# Patient Record
Sex: MALE | Race: WHITE | NOT HISPANIC OR LATINO | URBAN - METROPOLITAN AREA
[De-identification: names, ages, dates, MRNs, and addresses within clinical notes are randomized per-mention and may not be internally consistent; named-entity substitution may affect disease eponyms.]

---

## 2024-01-04 ENCOUNTER — EMERGENCY (EMERGENCY)
Facility: HOSPITAL | Age: 64
LOS: 1 days | Discharge: HOME | End: 2024-01-04
Attending: EMERGENCY MEDICINE | Admitting: EMERGENCY MEDICINE
Payer: COMMERCIAL

## 2024-01-04 VITALS
DIASTOLIC BLOOD PRESSURE: 71 MMHG | RESPIRATION RATE: 16 BRPM | HEART RATE: 81 BPM | OXYGEN SATURATION: 100 % | TEMPERATURE: 98 F | SYSTOLIC BLOOD PRESSURE: 129 MMHG

## 2024-01-04 VITALS
TEMPERATURE: 98 F | OXYGEN SATURATION: 97 % | RESPIRATION RATE: 16 BRPM | HEART RATE: 85 BPM | SYSTOLIC BLOOD PRESSURE: 146 MMHG | DIASTOLIC BLOOD PRESSURE: 76 MMHG

## 2024-01-04 LAB
ALBUMIN SERPL ELPH-MCNC: 4.3 G/DL — SIGNIFICANT CHANGE UP (ref 3.3–5)
ALBUMIN SERPL ELPH-MCNC: 4.3 G/DL — SIGNIFICANT CHANGE UP (ref 3.3–5)
ALP SERPL-CCNC: 77 U/L — SIGNIFICANT CHANGE UP (ref 40–120)
ALP SERPL-CCNC: 77 U/L — SIGNIFICANT CHANGE UP (ref 40–120)
ALT FLD-CCNC: 12 U/L — SIGNIFICANT CHANGE UP (ref 4–41)
ALT FLD-CCNC: 12 U/L — SIGNIFICANT CHANGE UP (ref 4–41)
ANION GAP SERPL CALC-SCNC: 11 MMOL/L — SIGNIFICANT CHANGE UP (ref 7–14)
ANION GAP SERPL CALC-SCNC: 11 MMOL/L — SIGNIFICANT CHANGE UP (ref 7–14)
AST SERPL-CCNC: 12 U/L — SIGNIFICANT CHANGE UP (ref 4–40)
AST SERPL-CCNC: 12 U/L — SIGNIFICANT CHANGE UP (ref 4–40)
BILIRUB SERPL-MCNC: 1.2 MG/DL — SIGNIFICANT CHANGE UP (ref 0.2–1.2)
BILIRUB SERPL-MCNC: 1.2 MG/DL — SIGNIFICANT CHANGE UP (ref 0.2–1.2)
BUN SERPL-MCNC: 15 MG/DL — SIGNIFICANT CHANGE UP (ref 7–23)
BUN SERPL-MCNC: 15 MG/DL — SIGNIFICANT CHANGE UP (ref 7–23)
CALCIUM SERPL-MCNC: 9.4 MG/DL — SIGNIFICANT CHANGE UP (ref 8.4–10.5)
CALCIUM SERPL-MCNC: 9.4 MG/DL — SIGNIFICANT CHANGE UP (ref 8.4–10.5)
CHLORIDE SERPL-SCNC: 100 MMOL/L — SIGNIFICANT CHANGE UP (ref 98–107)
CHLORIDE SERPL-SCNC: 100 MMOL/L — SIGNIFICANT CHANGE UP (ref 98–107)
CO2 SERPL-SCNC: 25 MMOL/L — SIGNIFICANT CHANGE UP (ref 22–31)
CO2 SERPL-SCNC: 25 MMOL/L — SIGNIFICANT CHANGE UP (ref 22–31)
CREAT SERPL-MCNC: 0.81 MG/DL — SIGNIFICANT CHANGE UP (ref 0.5–1.3)
CREAT SERPL-MCNC: 0.81 MG/DL — SIGNIFICANT CHANGE UP (ref 0.5–1.3)
CRP SERPL-MCNC: <3 MG/L — SIGNIFICANT CHANGE UP
CRP SERPL-MCNC: <3 MG/L — SIGNIFICANT CHANGE UP
EGFR: 100 ML/MIN/1.73M2 — SIGNIFICANT CHANGE UP
EGFR: 100 ML/MIN/1.73M2 — SIGNIFICANT CHANGE UP
GLUCOSE SERPL-MCNC: 107 MG/DL — HIGH (ref 70–99)
GLUCOSE SERPL-MCNC: 107 MG/DL — HIGH (ref 70–99)
HCT VFR BLD CALC: 44.3 % — SIGNIFICANT CHANGE UP (ref 39–50)
HCT VFR BLD CALC: 44.3 % — SIGNIFICANT CHANGE UP (ref 39–50)
HGB BLD-MCNC: 14.9 G/DL — SIGNIFICANT CHANGE UP (ref 13–17)
HGB BLD-MCNC: 14.9 G/DL — SIGNIFICANT CHANGE UP (ref 13–17)
MCHC RBC-ENTMCNC: 32.7 PG — SIGNIFICANT CHANGE UP (ref 27–34)
MCHC RBC-ENTMCNC: 32.7 PG — SIGNIFICANT CHANGE UP (ref 27–34)
MCHC RBC-ENTMCNC: 33.6 GM/DL — SIGNIFICANT CHANGE UP (ref 32–36)
MCHC RBC-ENTMCNC: 33.6 GM/DL — SIGNIFICANT CHANGE UP (ref 32–36)
MCV RBC AUTO: 97.1 FL — SIGNIFICANT CHANGE UP (ref 80–100)
MCV RBC AUTO: 97.1 FL — SIGNIFICANT CHANGE UP (ref 80–100)
NRBC # BLD: 0 /100 WBCS — SIGNIFICANT CHANGE UP (ref 0–0)
NRBC # BLD: 0 /100 WBCS — SIGNIFICANT CHANGE UP (ref 0–0)
NRBC # FLD: 0 K/UL — SIGNIFICANT CHANGE UP (ref 0–0)
NRBC # FLD: 0 K/UL — SIGNIFICANT CHANGE UP (ref 0–0)
PLATELET # BLD AUTO: 345 K/UL — SIGNIFICANT CHANGE UP (ref 150–400)
PLATELET # BLD AUTO: 345 K/UL — SIGNIFICANT CHANGE UP (ref 150–400)
POTASSIUM SERPL-MCNC: 4.4 MMOL/L — SIGNIFICANT CHANGE UP (ref 3.5–5.3)
POTASSIUM SERPL-MCNC: 4.4 MMOL/L — SIGNIFICANT CHANGE UP (ref 3.5–5.3)
POTASSIUM SERPL-SCNC: 4.4 MMOL/L — SIGNIFICANT CHANGE UP (ref 3.5–5.3)
POTASSIUM SERPL-SCNC: 4.4 MMOL/L — SIGNIFICANT CHANGE UP (ref 3.5–5.3)
PROT SERPL-MCNC: 9 G/DL — HIGH (ref 6–8.3)
PROT SERPL-MCNC: 9 G/DL — HIGH (ref 6–8.3)
RBC # BLD: 4.56 M/UL — SIGNIFICANT CHANGE UP (ref 4.2–5.8)
RBC # BLD: 4.56 M/UL — SIGNIFICANT CHANGE UP (ref 4.2–5.8)
RBC # FLD: 11.9 % — SIGNIFICANT CHANGE UP (ref 10.3–14.5)
RBC # FLD: 11.9 % — SIGNIFICANT CHANGE UP (ref 10.3–14.5)
SODIUM SERPL-SCNC: 136 MMOL/L — SIGNIFICANT CHANGE UP (ref 135–145)
SODIUM SERPL-SCNC: 136 MMOL/L — SIGNIFICANT CHANGE UP (ref 135–145)
WBC # BLD: 7.58 K/UL — SIGNIFICANT CHANGE UP (ref 3.8–10.5)
WBC # BLD: 7.58 K/UL — SIGNIFICANT CHANGE UP (ref 3.8–10.5)
WBC # FLD AUTO: 7.58 K/UL — SIGNIFICANT CHANGE UP (ref 3.8–10.5)
WBC # FLD AUTO: 7.58 K/UL — SIGNIFICANT CHANGE UP (ref 3.8–10.5)

## 2024-01-04 PROCEDURE — 70470 CT HEAD/BRAIN W/O & W/DYE: CPT | Mod: 26,MA

## 2024-01-04 PROCEDURE — 99285 EMERGENCY DEPT VISIT HI MDM: CPT

## 2024-01-04 NOTE — ED PROVIDER NOTE - NSFOLLOWUPINSTRUCTIONS_ED_ALL_ED_FT
No signs of emergency medical condition on today's workup.  Presumptive diagnosis made, but further evaluation may be required by your primary care doctor or specialist for a definitive diagnosis.  Therefore, follow up as directed and if symptoms change/worsen or any emergency conditions, please return to the ER.  Please follow up with your primary care doctor after you leave the emergency department so that they can follow up and conduct more testing and treatment as they deem necessary. If you have worsening signs or symptoms of what you came in to the Emergency Department today and are not able to see your doctor, go to your nearest emergency department or return to the [facility] emergency department for further care and management.    - Lab and imaging results, if performed, were discussed with you along with your discharge diagnosis    - Follow up with your doctor in 1 week - bring copies of your results if you were given. If you do not have a primary doctor, please call [phone] to find one convenient for you    - Return to the ED for any new, worsening, or concerning symptoms to you    - Continue all prescribed medications    - Take ibuprofen/tylenol as directed as needed for pain    - Rest and keep yourself hydrated with fluids    To control your pain at home, you should take Ibuprofen 400 mg along with Tylenol 650mg-1000mg every 6 to 8 hours. Limit your maximum daily Tylenol from all sources to 4000mg. Be aware that many other medications contain acetaminophen which is also known as Tylenol. Taking Tylenol and Ibuprofen together has been shown to be more effective at relieving pain than taking them separately. These are both over the counter medications that you can  at your local pharmacy without a prescription. You need to respect all of the warnings on the bottles. You shouldn’t take these medications for more than a week without following up with your doctor. Both medications come with certain risks and side effects that you need to discuss with your doctor, especially if you are taking them for a prolonged period.     There are still tests pending.  You will be contacted if they are positive.  Please follow up with a retina specialist from the list provided. No signs of emergency medical condition on today's workup.  Presumptive diagnosis made, but further evaluation may be required by your primary care doctor or specialist for a definitive diagnosis.  Therefore, follow up as directed and if symptoms change/worsen or any emergency conditions, please return to the ER.  Please follow up with your primary care doctor after you leave the emergency department so that they can follow up and conduct more testing and treatment as they deem necessary. If you have worsening signs or symptoms of what you came in to the Emergency Department today and are not able to see your doctor, go to your nearest emergency department or return to the [facility] emergency department for further care and management.    - Lab and imaging results, if performed, were discussed with you along with your discharge diagnosis    - Follow up with your doctor in 1 week - bring copies of your results if you were given. If you do not have a primary doctor, please call [phone] to find one convenient for you    - Return to the ED for any new, worsening, or concerning symptoms to you    - Continue all prescribed medications    - Take ibuprofen/tylenol as directed as needed for pain    - Rest and keep yourself hydrated with fluids    To control your pain at home, you should take Ibuprofen 400 mg along with Tylenol 650mg-1000mg every 6 to 8 hours. Limit your maximum daily Tylenol from all sources to 4000mg. Be aware that many other medications contain acetaminophen which is also known as Tylenol. Taking Tylenol and Ibuprofen together has been shown to be more effective at relieving pain than taking them separately. These are both over the counter medications that you can  at your local pharmacy without a prescription. You need to respect all of the warnings on the bottles. You shouldn’t take these medications for more than a week without following up with your doctor. Both medications come with certain risks and side effects that you need to discuss with your doctor, especially if you are taking them for a prolonged period.     There are still tests pending.  You will be contacted if they are positive.  Please follow up with a retina specialist from the list provided.

## 2024-01-04 NOTE — ED PROVIDER NOTE - OBJECTIVE STATEMENT
63 year old male with 1 week of blurry vision. Patient states symptoms stated on Dec 28th. have been consistent since. went to optometrist and ophthalmologist. was took he had small retina 63 year old male with 1 week of blurry vision. Patient states symptoms stated on Dec 28th. have been consistent since. went to optometrist and ophthalmologist. was took he had small retina tear. was prescribed artificial tears 1 day ago. presents with no new complaints. no pain on movement. feels blurry vision get worse throughout the day. blurriness not extinguished by covering 1 eye.

## 2024-01-04 NOTE — ED PROVIDER NOTE - PATIENT PORTAL LINK FT
You can access the FollowMyHealth Patient Portal offered by [facility] by registering at the following website: [email or web address]. By joining [facility]’s FollowMyHealth portal, you will also be able to view your health information using other applications (apps) compatible with our system. You can access the FollowMyHealth Patient Portal offered by [facility] by registering at the following website: [email or web address]. By joining [facility]’s FollowMyHealth portal, you will also be able to view your health information using other applications (apps) compatible with our system.

## 2024-01-04 NOTE — ED ADULT TRIAGE NOTE - CHIEF COMPLAINT QUOTE
pt c/o eye vision changes X 1 week, endorsing double vision that started 12/29/23. seen by ophthalmologist and has been taking eye drops. says "the DrRiley said I have a small tear". denies any pertinent medical history.

## 2024-01-04 NOTE — ED PROVIDER NOTE - CLINICAL SUMMARY MEDICAL DECISION MAKING FREE TEXT BOX
1 week of blurry vision. concenr for possible ms vs intracranial mass. low suspicion for GCA, given no temporal pain but will send crp. reassess following testing Will obtain CMP to rule out electrolyte abnormalities, liver failure or renal failure. Will obtain cbc to rule out anemia.

## 2024-01-05 ENCOUNTER — EMERGENCY (EMERGENCY)
Facility: HOSPITAL | Age: 64
LOS: 1 days | Discharge: HOME | End: 2024-01-05
Attending: EMERGENCY MEDICINE | Admitting: STUDENT IN AN ORGANIZED HEALTH CARE EDUCATION/TRAINING PROGRAM
Payer: COMMERCIAL

## 2024-01-05 VITALS
DIASTOLIC BLOOD PRESSURE: 78 MMHG | TEMPERATURE: 98 F | SYSTOLIC BLOOD PRESSURE: 125 MMHG | RESPIRATION RATE: 18 BRPM | OXYGEN SATURATION: 98 % | HEART RATE: 83 BPM

## 2024-01-05 LAB
A1C WITH ESTIMATED AVERAGE GLUCOSE RESULT: 4.1 % — SIGNIFICANT CHANGE UP (ref 4–5.6)
A1C WITH ESTIMATED AVERAGE GLUCOSE RESULT: 4.1 % — SIGNIFICANT CHANGE UP (ref 4–5.6)
ALBUMIN SERPL ELPH-MCNC: 4.1 G/DL — SIGNIFICANT CHANGE UP (ref 3.3–5)
ALBUMIN SERPL ELPH-MCNC: 4.1 G/DL — SIGNIFICANT CHANGE UP (ref 3.3–5)
ALP SERPL-CCNC: 73 U/L — SIGNIFICANT CHANGE UP (ref 40–120)
ALP SERPL-CCNC: 73 U/L — SIGNIFICANT CHANGE UP (ref 40–120)
ALT FLD-CCNC: 13 U/L — SIGNIFICANT CHANGE UP (ref 4–41)
ALT FLD-CCNC: 13 U/L — SIGNIFICANT CHANGE UP (ref 4–41)
AMPHET UR-MCNC: NEGATIVE — SIGNIFICANT CHANGE UP
AMPHET UR-MCNC: NEGATIVE — SIGNIFICANT CHANGE UP
ANION GAP SERPL CALC-SCNC: 10 MMOL/L — SIGNIFICANT CHANGE UP (ref 7–14)
ANION GAP SERPL CALC-SCNC: 10 MMOL/L — SIGNIFICANT CHANGE UP (ref 7–14)
APTT BLD: 33 SEC — SIGNIFICANT CHANGE UP (ref 24.5–35.6)
APTT BLD: 33 SEC — SIGNIFICANT CHANGE UP (ref 24.5–35.6)
AST SERPL-CCNC: 14 U/L — SIGNIFICANT CHANGE UP (ref 4–40)
AST SERPL-CCNC: 14 U/L — SIGNIFICANT CHANGE UP (ref 4–40)
BARBITURATES UR SCN-MCNC: NEGATIVE — SIGNIFICANT CHANGE UP
BARBITURATES UR SCN-MCNC: NEGATIVE — SIGNIFICANT CHANGE UP
BASOPHILS # BLD AUTO: 0.04 K/UL — SIGNIFICANT CHANGE UP (ref 0–0.2)
BASOPHILS # BLD AUTO: 0.04 K/UL — SIGNIFICANT CHANGE UP (ref 0–0.2)
BASOPHILS NFR BLD AUTO: 0.5 % — SIGNIFICANT CHANGE UP (ref 0–2)
BASOPHILS NFR BLD AUTO: 0.5 % — SIGNIFICANT CHANGE UP (ref 0–2)
BENZODIAZ UR-MCNC: NEGATIVE — SIGNIFICANT CHANGE UP
BENZODIAZ UR-MCNC: NEGATIVE — SIGNIFICANT CHANGE UP
BILIRUB SERPL-MCNC: 1.3 MG/DL — HIGH (ref 0.2–1.2)
BILIRUB SERPL-MCNC: 1.3 MG/DL — HIGH (ref 0.2–1.2)
BUN SERPL-MCNC: 16 MG/DL — SIGNIFICANT CHANGE UP (ref 7–23)
BUN SERPL-MCNC: 16 MG/DL — SIGNIFICANT CHANGE UP (ref 7–23)
CALCIUM SERPL-MCNC: 9.5 MG/DL — SIGNIFICANT CHANGE UP (ref 8.4–10.5)
CALCIUM SERPL-MCNC: 9.5 MG/DL — SIGNIFICANT CHANGE UP (ref 8.4–10.5)
CHLORIDE SERPL-SCNC: 100 MMOL/L — SIGNIFICANT CHANGE UP (ref 98–107)
CHLORIDE SERPL-SCNC: 100 MMOL/L — SIGNIFICANT CHANGE UP (ref 98–107)
CHOLEST SERPL-MCNC: 141 MG/DL — SIGNIFICANT CHANGE UP
CHOLEST SERPL-MCNC: 141 MG/DL — SIGNIFICANT CHANGE UP
CO2 SERPL-SCNC: 26 MMOL/L — SIGNIFICANT CHANGE UP (ref 22–31)
CO2 SERPL-SCNC: 26 MMOL/L — SIGNIFICANT CHANGE UP (ref 22–31)
COCAINE METAB.OTHER UR-MCNC: NEGATIVE — SIGNIFICANT CHANGE UP
COCAINE METAB.OTHER UR-MCNC: NEGATIVE — SIGNIFICANT CHANGE UP
CREAT SERPL-MCNC: 0.83 MG/DL — SIGNIFICANT CHANGE UP (ref 0.5–1.3)
CREAT SERPL-MCNC: 0.83 MG/DL — SIGNIFICANT CHANGE UP (ref 0.5–1.3)
CREATININE URINE RESULT, DAU: 186 MG/DL — SIGNIFICANT CHANGE UP
CREATININE URINE RESULT, DAU: 186 MG/DL — SIGNIFICANT CHANGE UP
CRP SERPL-MCNC: <3 MG/L — SIGNIFICANT CHANGE UP
CRP SERPL-MCNC: <3 MG/L — SIGNIFICANT CHANGE UP
EGFR: 99 ML/MIN/1.73M2 — SIGNIFICANT CHANGE UP
EGFR: 99 ML/MIN/1.73M2 — SIGNIFICANT CHANGE UP
EOSINOPHIL # BLD AUTO: 0.07 K/UL — SIGNIFICANT CHANGE UP (ref 0–0.5)
EOSINOPHIL # BLD AUTO: 0.07 K/UL — SIGNIFICANT CHANGE UP (ref 0–0.5)
EOSINOPHIL NFR BLD AUTO: 0.9 % — SIGNIFICANT CHANGE UP (ref 0–6)
EOSINOPHIL NFR BLD AUTO: 0.9 % — SIGNIFICANT CHANGE UP (ref 0–6)
ERYTHROCYTE [SEDIMENTATION RATE] IN BLOOD: 18 MM/HR — HIGH (ref 1–15)
ERYTHROCYTE [SEDIMENTATION RATE] IN BLOOD: 18 MM/HR — HIGH (ref 1–15)
ESTIMATED AVERAGE GLUCOSE: 71 — SIGNIFICANT CHANGE UP
ESTIMATED AVERAGE GLUCOSE: 71 — SIGNIFICANT CHANGE UP
GLUCOSE SERPL-MCNC: 100 MG/DL — HIGH (ref 70–99)
GLUCOSE SERPL-MCNC: 100 MG/DL — HIGH (ref 70–99)
HCT VFR BLD CALC: 45 % — SIGNIFICANT CHANGE UP (ref 39–50)
HCT VFR BLD CALC: 45 % — SIGNIFICANT CHANGE UP (ref 39–50)
HDLC SERPL-MCNC: 60 MG/DL — SIGNIFICANT CHANGE UP
HDLC SERPL-MCNC: 60 MG/DL — SIGNIFICANT CHANGE UP
HGB BLD-MCNC: 14.9 G/DL — SIGNIFICANT CHANGE UP (ref 13–17)
HGB BLD-MCNC: 14.9 G/DL — SIGNIFICANT CHANGE UP (ref 13–17)
IANC: 5.05 K/UL — SIGNIFICANT CHANGE UP (ref 1.8–7.4)
IANC: 5.05 K/UL — SIGNIFICANT CHANGE UP (ref 1.8–7.4)
IMM GRANULOCYTES NFR BLD AUTO: 0.2 % — SIGNIFICANT CHANGE UP (ref 0–0.9)
IMM GRANULOCYTES NFR BLD AUTO: 0.2 % — SIGNIFICANT CHANGE UP (ref 0–0.9)
INR BLD: 1 RATIO — SIGNIFICANT CHANGE UP (ref 0.85–1.18)
INR BLD: 1 RATIO — SIGNIFICANT CHANGE UP (ref 0.85–1.18)
LIPID PNL WITH DIRECT LDL SERPL: 72 MG/DL — SIGNIFICANT CHANGE UP
LIPID PNL WITH DIRECT LDL SERPL: 72 MG/DL — SIGNIFICANT CHANGE UP
LYMPHOCYTES # BLD AUTO: 2.42 K/UL — SIGNIFICANT CHANGE UP (ref 1–3.3)
LYMPHOCYTES # BLD AUTO: 2.42 K/UL — SIGNIFICANT CHANGE UP (ref 1–3.3)
LYMPHOCYTES # BLD AUTO: 29.4 % — SIGNIFICANT CHANGE UP (ref 13–44)
LYMPHOCYTES # BLD AUTO: 29.4 % — SIGNIFICANT CHANGE UP (ref 13–44)
MCHC RBC-ENTMCNC: 32.6 PG — SIGNIFICANT CHANGE UP (ref 27–34)
MCHC RBC-ENTMCNC: 32.6 PG — SIGNIFICANT CHANGE UP (ref 27–34)
MCHC RBC-ENTMCNC: 33.1 GM/DL — SIGNIFICANT CHANGE UP (ref 32–36)
MCHC RBC-ENTMCNC: 33.1 GM/DL — SIGNIFICANT CHANGE UP (ref 32–36)
MCV RBC AUTO: 98.5 FL — SIGNIFICANT CHANGE UP (ref 80–100)
MCV RBC AUTO: 98.5 FL — SIGNIFICANT CHANGE UP (ref 80–100)
METHADONE UR-MCNC: NEGATIVE — SIGNIFICANT CHANGE UP
METHADONE UR-MCNC: NEGATIVE — SIGNIFICANT CHANGE UP
MONOCYTES # BLD AUTO: 0.62 K/UL — SIGNIFICANT CHANGE UP (ref 0–0.9)
MONOCYTES # BLD AUTO: 0.62 K/UL — SIGNIFICANT CHANGE UP (ref 0–0.9)
MONOCYTES NFR BLD AUTO: 7.5 % — SIGNIFICANT CHANGE UP (ref 2–14)
MONOCYTES NFR BLD AUTO: 7.5 % — SIGNIFICANT CHANGE UP (ref 2–14)
NEUTROPHILS # BLD AUTO: 5.05 K/UL — SIGNIFICANT CHANGE UP (ref 1.8–7.4)
NEUTROPHILS # BLD AUTO: 5.05 K/UL — SIGNIFICANT CHANGE UP (ref 1.8–7.4)
NEUTROPHILS NFR BLD AUTO: 61.5 % — SIGNIFICANT CHANGE UP (ref 43–77)
NEUTROPHILS NFR BLD AUTO: 61.5 % — SIGNIFICANT CHANGE UP (ref 43–77)
NON HDL CHOLESTEROL: 81 MG/DL — SIGNIFICANT CHANGE UP
NON HDL CHOLESTEROL: 81 MG/DL — SIGNIFICANT CHANGE UP
NRBC # BLD: 0 /100 WBCS — SIGNIFICANT CHANGE UP (ref 0–0)
NRBC # BLD: 0 /100 WBCS — SIGNIFICANT CHANGE UP (ref 0–0)
NRBC # FLD: 0 K/UL — SIGNIFICANT CHANGE UP (ref 0–0)
NRBC # FLD: 0 K/UL — SIGNIFICANT CHANGE UP (ref 0–0)
OPIATES UR-MCNC: NEGATIVE — SIGNIFICANT CHANGE UP
OPIATES UR-MCNC: NEGATIVE — SIGNIFICANT CHANGE UP
OXYCODONE UR-MCNC: NEGATIVE — SIGNIFICANT CHANGE UP
OXYCODONE UR-MCNC: NEGATIVE — SIGNIFICANT CHANGE UP
PCP SPEC-MCNC: SIGNIFICANT CHANGE UP
PCP SPEC-MCNC: SIGNIFICANT CHANGE UP
PCP UR-MCNC: NEGATIVE — SIGNIFICANT CHANGE UP
PCP UR-MCNC: NEGATIVE — SIGNIFICANT CHANGE UP
PLATELET # BLD AUTO: 343 K/UL — SIGNIFICANT CHANGE UP (ref 150–400)
PLATELET # BLD AUTO: 343 K/UL — SIGNIFICANT CHANGE UP (ref 150–400)
POTASSIUM SERPL-MCNC: 4.6 MMOL/L — SIGNIFICANT CHANGE UP (ref 3.5–5.3)
POTASSIUM SERPL-MCNC: 4.6 MMOL/L — SIGNIFICANT CHANGE UP (ref 3.5–5.3)
POTASSIUM SERPL-SCNC: 4.6 MMOL/L — SIGNIFICANT CHANGE UP (ref 3.5–5.3)
POTASSIUM SERPL-SCNC: 4.6 MMOL/L — SIGNIFICANT CHANGE UP (ref 3.5–5.3)
PROT SERPL-MCNC: 8.7 G/DL — HIGH (ref 6–8.3)
PROT SERPL-MCNC: 8.7 G/DL — HIGH (ref 6–8.3)
PROTHROM AB SERPL-ACNC: 11.3 SEC — SIGNIFICANT CHANGE UP (ref 9.5–13)
PROTHROM AB SERPL-ACNC: 11.3 SEC — SIGNIFICANT CHANGE UP (ref 9.5–13)
RBC # BLD: 4.57 M/UL — SIGNIFICANT CHANGE UP (ref 4.2–5.8)
RBC # BLD: 4.57 M/UL — SIGNIFICANT CHANGE UP (ref 4.2–5.8)
RBC # FLD: 11.9 % — SIGNIFICANT CHANGE UP (ref 10.3–14.5)
RBC # FLD: 11.9 % — SIGNIFICANT CHANGE UP (ref 10.3–14.5)
SODIUM SERPL-SCNC: 136 MMOL/L — SIGNIFICANT CHANGE UP (ref 135–145)
SODIUM SERPL-SCNC: 136 MMOL/L — SIGNIFICANT CHANGE UP (ref 135–145)
THC UR QL: NEGATIVE — SIGNIFICANT CHANGE UP
THC UR QL: NEGATIVE — SIGNIFICANT CHANGE UP
TRIGL SERPL-MCNC: 46 MG/DL — SIGNIFICANT CHANGE UP
TRIGL SERPL-MCNC: 46 MG/DL — SIGNIFICANT CHANGE UP
WBC # BLD: 8.22 K/UL — SIGNIFICANT CHANGE UP (ref 3.8–10.5)
WBC # BLD: 8.22 K/UL — SIGNIFICANT CHANGE UP (ref 3.8–10.5)
WBC # FLD AUTO: 8.22 K/UL — SIGNIFICANT CHANGE UP (ref 3.8–10.5)
WBC # FLD AUTO: 8.22 K/UL — SIGNIFICANT CHANGE UP (ref 3.8–10.5)

## 2024-01-05 PROCEDURE — 99223 1ST HOSP IP/OBS HIGH 75: CPT

## 2024-01-05 RX ORDER — NICOTINE POLACRILEX 2 MG
4 GUM BUCCAL
Refills: 0 | Status: DISCONTINUED | OUTPATIENT
Start: 2024-01-05 | End: 2024-01-09

## 2024-01-05 RX ORDER — ACETAMINOPHEN 500 MG
975 TABLET ORAL EVERY 6 HOURS
Refills: 0 | Status: DISCONTINUED | OUTPATIENT
Start: 2024-01-05 | End: 2024-01-09

## 2024-01-05 NOTE — ED PROVIDER NOTE - CLINICAL SUMMARY MEDICAL DECISION MAKING FREE TEXT BOX
63-year-old male no past medical history presents to the ED with continued B/L blurry vision now worsening describing it as 'tunnel like".  States his peripheral vision is blackened bilaterally and the letters that are in front of him centrally are blurring on top of each other.  Seen by ophthalmologist Geronimo Schaffer 3 days ago and told "he had a small left retinal tear ".  Denies fever chills, headache, dizziness, floaters, flashing lights, curtain, sensation, eye pain, chest pain, shortness of breath, abdominal pain, nausea, vomiting, diarrhea, weakness, numbness, tingling.  Seen here in ED yesterday where CT head was negative and labs stable.  Advised to follow-up with ophthalmology outpatient. Called Dr. Schaffer who advised ED evaluation.   Plan: Optho and Neuro consult- central etiology?

## 2024-01-05 NOTE — CONSULT NOTE ADULT - ASSESSMENT
63y male with no past medical history/ocular history consulted for bilateral blurry vision.    #Blurry vision, both eyes  - Pt complaining of worsening blurry vision and loss of peripheral vision in both eyes  - Was last seen by outpt ophtho this week and was told eyes were normal  - VA 20/20 OU, no RAPD, IOP wnl, EOM full, CVF full, color plates full   - No acute findings seen on exam  - Possible that pt may require reading glasses and cataract evaluation outpatient  - Rest of workup per neurology    Outpatient Follow-up: Patient should follow-up with his/her ophthalmologist or with [facility] Department of Ophthalmology within 1 week of after discharge at:    [address]  [place] [address]  [phone]    Diallo Damon MD, PGY-2  Also available on [employer] Teams     63y male with no past medical history/ocular history consulted for bilateral blurry vision.    #Blurry vision, both eyes  - Pt complaining of worsening blurry vision and loss of peripheral vision in both eyes  - Was last seen by outpt ophtho this week and was told eyes were normal  - VA 20/20 OU, no RAPD, IOP wnl, EOM full, CVF full, color plates full   - No acute findings seen on exam  - Possible that pt may require reading glasses and cataract evaluation outpatient  - Rest of workup per neurology    Outpatient Follow-up: Patient should follow-up with his/her ophthalmologist or with [facility] Department of Ophthalmology within 1 week of after discharge at:    [address]  [place] [address]  [phone]    Diallo Damon MD, PGY-2  Also available on [employer] Teams

## 2024-01-05 NOTE — CONSULT NOTE ADULT - ATTENDING COMMENTS
Exam:  Visual acuity 20/20 OU  Fundus - discs normal color and sharp disc margins.   VFF to CF  No RAPD.    A/P  Mr. Lilly is a 64 yo man with c/o blurred vision with normal ophthalmological exam, normal visual acuity, VFF to CF.   MRI brain, w/wo gado to exclude central lesion causing visual symptoms.   F/U outpatient with neuro-ophthalmology if no abnormality on MRI brain.   D/W patient  Thank you Exam:  Visual acuity 20/20 OU  Fundus - discs normal color and sharp disc margins.   VFF to CF  No RAPD.    A/P  Mr. Lilly is a 62 yo man with c/o blurred vision with normal ophthalmological exam, normal visual acuity, VFF to CF.   MRI brain, w/wo gado to exclude central lesion causing visual symptoms.   F/U outpatient with neuro-ophthalmology if no abnormality on MRI brain.   D/W patient  Thank you

## 2024-01-05 NOTE — CONSULT NOTE ADULT - SUBJECTIVE AND OBJECTIVE BOX
[facility] DEPARTMENT OF OPHTHALMOLOGY - INITIAL ADULT CONSULT  ----------------------------------------------------------------------------------------------------  Diallo Damon MD PGY-2  Available on teams  ----------------------------------------------------------------------------------------------------    HPI: 63-year-old male no past medical history presents to the ED with continued B/L blurry vision now worsening describing it as 'tunnel like".  States his peripheral vision is blackened bilaterally and the letters that are in front of him centrally are blurring on top of each other.  Seen by ophthalmologist Geronimo Schaffer 3 days ago and told "he had a small left retinal tear ".  Denies fever chills, headache, dizziness, floaters, flashing lights, curtain, sensation, eye pain, chest pain, shortness of breath, abdominal pain, nausea, vomiting, diarrhea, weakness, numbness, tingling.  Seen here in ED yesterday where CT head was negative and labs stable.  Advised to follow-up with ophthalmology outpatient. Called Dr. Schaffer who advised ED evaluation.    Interval History: Pt states he has trouble focusing with his vision and is looking through frosted glass. Pt last saw his ophthalmologist Dr. Pretty 2 days ago and was told his eyes were normal. Denies any flashes, floaters, curtain in the vision, no pain. Sx are bilateral.    PAST MEDICAL & SURGICAL HISTORY:  No pertinent past medical history      No significant past surgical history        Past Ocular History: n/a  Ophthalmic Medications: none  FAMILY HISTORY:    Social History: smoker    MEDICATIONS  (STANDING):    MEDICATIONS  (PRN):  acetaminophen     Tablet .. 975 milliGRAM(s) Oral every 6 hours PRN Temp greater or equal to 38C (100.4F), Mild Pain (1 - 3)  nicotine  Polacrilex Gum 4 milliGRAM(s) Oral every 3 hours PRN nicotine dependence    Allergies & Intolerances:   No Known Allergies    Review of Systems:  Constitutional: No fever, chills  Eyes: +blurry vision  Neuro: No tremors  Cardiovascular: No chest pain, palpitations  Respiratory: No SOB, no cough  GI: No nausea, vomiting, abdominal pain  : No dysuria  Skin: no rash  Psych: no depression  Endocrine: no polyuria, polydipsia  Heme/lymph: no swelling    VITALS: T(C): 36.8 (01-05-24 @ 18:06)  T(F): 98.3 (01-05-24 @ 18:06), Max: 98.3 (01-05-24 @ 18:06)  HR: 78 (01-05-24 @ 18:06) (78 - 83)  BP: 117/66 (01-05-24 @ 18:06) (117/66 - 125/78)  RR:  (16 - 18)  SpO2:  (98% - 98%)  Wt(kg): --  General: AAO x 3, appropriate mood and affect    Ophthalmology Exam:  Visual acuity (cc): 20/20 OD, 20/20 OS  Pupils: PERRL OU, no APD  Ttono: 15 OD, 18 OS  Extraocular movements (EOMs): Full OU, no pain, no diplopia  Confrontational Visual Field (CVF): Full OU  Color Plates: 12/12 OD, 12/12 OS  Amsler grid: lines straight OD and OS    Pen Light Exam (PLE)  External: Flat OU  Lids/Lashes/Lacrimal Ducts: Flat OU    Sclera/Conjunctiva: W+Q OU  Cornea: Cl OU  Anterior Chamber: D+F OU    Iris: Flat OU  Lens: cataracts OU    Fundus Exam: dilated with 1% tropicamide and 2.5% phenylephrine  Approval obtained from Angella Aguilar at 4:33pm for dilation  Patient aware that pupils can remained dilated for at least 4-6 hours  Exam performed with 20D lens    Vitreous: wnl OU  Disc, cup/disc: sharp and pink, 0.3 OU  Macula: wnl OU  Vessels: wnl OU  Periphery: wnl OU    Labs/Imaging:  ***   [facility] DEPARTMENT OF OPHTHALMOLOGY - INITIAL ADULT CONSULT  ----------------------------------------------------------------------------------------------------  Diallo Damon MD PGY-2  Available on teams  ----------------------------------------------------------------------------------------------------    HPI: 63-year-old male no past medical history presents to the ED with continued B/L blurry vision now worsening describing it as 'tunnel like".  States his peripheral vision is blackened bilaterally and the letters that are in front of him centrally are blurring on top of each other.  Seen by ophthalmologist Geronimo Schaffer 3 days ago and told "he had a small left retinal tear ".  Denies fever chills, headache, dizziness, floaters, flashing lights, curtain, sensation, eye pain, chest pain, shortness of breath, abdominal pain, nausea, vomiting, diarrhea, weakness, numbness, tingling.  Seen here in ED yesterday where CT head was negative and labs stable.  Advised to follow-up with ophthalmology outpatient. Called Dr. Schaffer who advised ED evaluation.    Interval History: Pt states he has trouble focusing with his vision and is looking through frosted glass. Pt last saw his ophthalmologist Dr. Pretty 2 days ago and was told his eyes were normal. Denies any flashes, floaters, curtain in the vision, no pain. Sx are bilateral.    PAST MEDICAL & SURGICAL HISTORY:  No pertinent past medical history      No significant past surgical history        Past Ocular History: n/a  Ophthalmic Medications: none  FAMILY HISTORY:    Social History: smoker    MEDICATIONS  (STANDING):    MEDICATIONS  (PRN):  acetaminophen     Tablet .. 975 milliGRAM(s) Oral every 6 hours PRN Temp greater or equal to 38C (100.4F), Mild Pain (1 - 3)  nicotine  Polacrilex Gum 4 milliGRAM(s) Oral every 3 hours PRN nicotine dependence    Allergies & Intolerances:   No Known Allergies    Review of Systems:  Constitutional: No fever, chills  Eyes: +blurry vision  Neuro: No tremors  Cardiovascular: No chest pain, palpitations  Respiratory: No SOB, no cough  GI: No nausea, vomiting, abdominal pain  : No dysuria  Skin: no rash  Psych: no depression  Endocrine: no polyuria, polydipsia  Heme/lymph: no swelling    VITALS: T(C): 36.8 (01-05-24 @ 18:06)  T(F): 98.3 (01-05-24 @ 18:06), Max: 98.3 (01-05-24 @ 18:06)  HR: 78 (01-05-24 @ 18:06) (78 - 83)  BP: 117/66 (01-05-24 @ 18:06) (117/66 - 125/78)  RR:  (16 - 18)  SpO2:  (98% - 98%)  Wt(kg): --  General: AAO x 3, appropriate mood and affect    Ophthalmology Exam:  Visual acuity (cc): 20/20 OD, 20/20 OS  Pupils: PERRL OU, no APD  Ttono: 15 OD, 18 OS  Extraocular movements (EOMs): Full OU, no pain, no diplopia  Confrontational Visual Field (CVF): Full OU  Color Plates: 12/12 OD, 12/12 OS  Amsler grid: lines straight OD and OS    Pen Light Exam (PLE)  External: Flat OU  Lids/Lashes/Lacrimal Ducts: Flat OU    Sclera/Conjunctiva: W+Q OU  Cornea: Cl OU  Anterior Chamber: D+F OU    Iris: Flat OU  Lens: cataracts OU    Fundus Exam: dilated with 1% tropicamide and 2.5% phenylephrine  Approval obtained from Angella Aguilar at 4:33pm for dilation  Patient aware that pupils can remained dilated for at least 4-6 hours  Exam performed with 20D lens    Vitreous: wnl OU  Disc, cup/disc: sharp and pink, 0.3 OU  Macula: wnl OU  Vessels: wnl OU  Periphery: wnl OU    Labs/Imaging:  ***

## 2024-01-05 NOTE — ED PROVIDER NOTE - PROGRESS NOTE DETAILS
Spoke with Liat Mccabe. Rec Neuro consult. At [facility] and won't be able to get to [facility] until 6pm. Neuro called Spoke with Liat Mccabe. Rec Neuro consult. At [facility] and won't be able to get to [facility] until 6pm. Neuro called Spoke with Liat Mccabe. Rec Neuro consult. At [facility] and won't be able to get to [facility] until 6pm. Neuro called. Neuro will see patient shortly Spoke with Liat Mccabe. Rec Neuro consult. At [facility] and won't be able to get to [facility] until 6pm. Neuro called. Neuro will see patient shortly REFUGIO Gonzalez- Spoke with Optyanique Mccabe. Rec Neuro consult. At [facility] and won't be able to get to [facility] until 6pm. Neuro called. Neuro will see patient shortly REFUGIO Gonzalez- Spoke with Optyanique Mccabe. Rec Neuro consult. At [facility] and won't be able to get to [facility] until 6pm. Neuro called. Neuro will see patient shortly REFUGIO whitley at bedside. Seen by Neuro. Recommends labs 9see consult note), MRI brain w/ and wo contrast0 "r/o occipital lobe pathology". Will Dispo for CDU

## 2024-01-05 NOTE — CONSULT NOTE ADULT - ASSESSMENT
Assessment: ***. Initial VS in ED: ***. Exam: ***.      mRS: 0  LKN: Dec 28, 23  NIHSS:0    Impression: Bilateral eye blurry vision without diplopia, tunnel vision, vision dimming, progressive over 2 weeks, unclear etiology. WOuld get imaging to evaluate for     Recommendations:  [] Opthalmology formal evaluation of vision  [] CDU for MRI brain with and without contrast  [] Lipid panel, HbA1c, CBC, CMP, coagulation panel, troponin, Vit A, Vit E, RPR, ESR, CRP  [] DVT ppx per primary team    Patient to be seen on AM neurology rounds.   Case and plan discussed with Dr. Eli, neurology attending.    Note and plan final upon attending attestation  Assessment: 63y (1960) man, no PMH but patient has not seen his PCP in 10 years, who presents to [facility] ED for blurry vision. Patient reports on Dec 29, he noticed a slight change in his vision, described as things being slightly out of focus, as though he needed better glasses. The next day, his vision worsened a little more, so he went to his ophthalmologist. At the ophthalmologist, he said his vision while looking at the letters would be sharp in the center and blurry or distended at the edges. Nothing was found on the exam and he was told he might need reading glasses. However, over last two weeks, he feels his vision has been worsening and every day he is seeing less and less. He can't quite make out his face in the bathroom mirror and he needs more and more light to see objects everyday. His focal vision in the center of his vision is still somewhat; but he has to look at writing one letter at a time to make things out, otherwise his letters blur together or overlap on top of each other. If his vision was a "10" in the beginning, it is now a "3." He is terrified that he is losing his vision and that he may no longer be able to see. He complains of a slight headache, but is trying to stay optimistic. Patient endorses smoking 1 pack every 3 days, has smoked for 40 years. He denies any alcohol use. Patient reports his vision is blurry regardless of whether one eye is closed or both are open and is the same either way.     mRS: 0  LKN: Dec 28, 23  NIHSS:0    Impression: Bilateral eye blurry vision without diplopia, tunnel vision, vision dimming, progressive over 2 weeks, unclear etiology. Would get imaging to evaluate for possible vision loss with central sparing due to occipital lobe/PCA territory etiology.    Recommendations:  [] Opthalmology formal evaluation of vision  [] CDU for MRI brain with and without contrast  [] Lipid panel, HbA1c, CBC, CMP, coagulation panel, troponin, Vit A, Vit E, RPR, ESR, CRP, utox  [] DVT ppx per primary team    Patient to be seen on AM neurology rounds.   Case and plan discussed with Dr. Eli, neurology attending.    Note and plan final upon attending attestation  Assessment: 63y (1960) man, no PMH but patient has not seen his PCP in 10 years, who presents to [facility] ED for blurry vision. Patient reports on Dec 29, he noticed a slight change in his vision, described as things being slightly out of focus, as though he needed better glasses. The next day, his vision worsened a little more, so he went to his ophthalmologist. At the ophthalmologist, he said his vision while looking at the letters would be sharp in the center and blurry or distended at the edges. Nothing was found on the exam and he was told he might need reading glasses. However, over last two weeks, he feels his vision has been worsening and every day he is seeing less and less. He can't quite make out his face in the bathroom mirror and he needs more and more light to see objects everyday. His focal vision in the center of his vision is still somewhat; but he has to look at writing one letter at a time to make things out, otherwise his letters blur together or overlap on top of each other. If his vision was a "10" in the beginning, it is now a "3." He is terrified that he is losing his vision and that he may no longer be able to see. He complains of a slight headache, but is trying to stay optimistic. Patient endorses smoking 1 pack every 3 days, has smoked for 40 years. He denies any alcohol use. Patient reports his vision is blurry regardless of whether one eye is closed or both are open and is the same either way.     mRS: 0  LKN: Dec 28, 23  NIHSS:0    Impression: Bilateral eye blurry vision without diplopia, tunnel vision, vision dimming, progressive over 2 weeks, unclear etiology. Would get imaging to evaluate for possible vision loss with central sparing due to occipital lobe/PCA territory etiology.    Recommendations:  [] Opthalmology formal evaluation of vision  [] CDU for MRI brain with and without contrast  [] Lipid panel, HbA1c, CBC, CMP, coagulation panel, troponin, Vit A, Vit E, RPR, ESR, CRP, utox  [] DVT ppx per primary team    Patient to be seen on AM neurology rounds.   Case and plan discussed with Dr. Eli, neurology attending.    Note and plan final upon attending attestation

## 2024-01-05 NOTE — ED [facility] PROVIDER INITIAL DAY NOTE - CLINICAL SUMMARY MEDICAL DECISION MAKING FREE TEXT BOX
63-year-old male no past medical history presents to the ED with continued B/L blurry vision now worsening describing it as 'tunnel like".  States his peripheral vision is blackened bilaterally and the letters that are in front of him centrally are blurring on top of each other.  Seen by ophthalmologist Geronimo Schaffer 3 days ago and told "he had a small left retinal tear ".  Denies fever chills, headache, dizziness, floaters, flashing lights, curtain, sensation, eye pain, chest pain, shortness of breath, abdominal pain, nausea, vomiting, diarrhea, weakness, numbness, tingling.  Seen here in ED yesterday where CT head was negative and labs stable.  Advised to follow-up with ophthalmology outpatient. Called Dr. Schaffer who advised ED evaluation.    [facility] PA: Seen by Optho and by Neuro in ED. Recommends labs (see Neuro consult note), MRI brain w/ and wo contrast "r/o occipital lobe pathology".

## 2024-01-05 NOTE — CONSULT NOTE ADULT - SUBJECTIVE AND OBJECTIVE BOX
Neurology - Consult Note    -  Spectra: [phone] ([facility]), [phone] ([facility])  -    HPI: Patient JAYE COYLE is a 63y (1960) wo/man with a PMHx significant for ***      Review of Systems:  INCOMPLETE   CONSTITUTIONAL: No fevers or chills  EYES AND ENT: No visual changes or no throat pain   NECK: No pain or stiffness  RESPIRATORY: No hemoptysis or shortness of breath  CARDIOVASCULAR: No chest pain or palpitations  GASTROINTESTINAL: No melena or hematochezia  GENITOURINARY: No dysuria or hematuria  NEUROLOGICAL: +As stated in HPI above  SKIN: No itching, burning, rashes, or lesions   All other review of systems is negative unless indicated above.    Allergies:  No Known Allergies      PMHx/PSHx/Family Hx: As above, otherwise see below   No pertinent past medical history        Social Hx:  No current use of tobacco, alcohol, or illicit drugs  Lives with ***    Medications:  MEDICATIONS  (STANDING):    MEDICATIONS  (PRN):      Vitals:  T(C): 36.7 (01-05-24 @ 14:10), Max: 36.9 (01-04-24 @ 18:03)  HR: 83 (01-05-24 @ 14:10) (81 - 83)  BP: 125/78 (01-05-24 @ 14:10) (125/78 - 129/71)  RR: 18 (01-05-24 @ 14:10) (16 - 18)  SpO2: 98% (01-05-24 @ 14:10) (98% - 100%)    Physical Examination: INCOMPLETE  General - NAD  Eyes -  Fundoscopy not well visualized    Neurologic Exam:  Mental status - Awake, Alert, Oriented to person, place, and time. Speech fluent, repetition and naming intact. Follows simple commands.  Cranial nerves - PERRL, VFF, Acuity 20/20 on R, and 20/20-3 on L, no red desaturation  EOMI, no nystagmus,  face sensation (V1-V3) intact b/l, facial strength intact without asymmetry b/l, hearing intact b/l, palate with symmetric elevation, trapezius  5/5 strength b/l, tongue midline on protrusion with full lateral movement    Motor - Normal bulk and tone throughout. No pronator drift.    Strength testing            Deltoid      Biceps      Triceps     Wrist Extension    Wrist Flexion     Interossei         R            5                 5               5                     5                              5                        5                 5  L             5                 5               5                     5                              5                        5                 5              Hip Flexion    Hip Extension    Knee Flexion    Knee Extension    Dorsiflexion    Plantar Flexion  R              5                           5                       5                           5                            5                          5  L              5                           5                        5                           5                            5                          5    Sensation - Light touch intact throughout    DTR's - brisk throughout             Biceps      Triceps     Brachioradialis      Patellar    Ankle    Toes/plantar response  R             2+             2+                  2+                2+            2+                 Down  L              2+             2+                 2+                2+           2+                  up    Coordination - Finger to Nose intact b/l. No tremors appreciated    Gait and station - Normal casual gait. Romberg (-)    Labs:                        14.9   7.58  )-----------( 345      ( 04 Jan 2024 16:30 )             44.3     01-04    136  |  100  |  15  ----------------------------<  107<H>  4.4   |  25  |  0.81    Ca    9.4      04 Jan 2024 16:30    TPro  9.0<H>  /  Alb  4.3  /  TBili  1.2  /  DBili  x   /  AST  12  /  ALT  12  /  AlkPhos  77  01-04    CAPILLARY BLOOD GLUCOSE        LIVER FUNCTIONS - ( 04 Jan 2024 16:30 )  Alb: 4.3 g/dL / Pro: 9.0 g/dL / ALK PHOS: 77 U/L / ALT: 12 U/L / AST: 12 U/L / GGT: x               CSF:                  Radiology:     Neurology - Consult Note    -  Spectra: [phone] ([facility]), [phone] ([facility])  -    HPI: Patient JAYE COYLE is a 63y (1960) wo/man with a PMHx significant for ***      Review of Systems:  INCOMPLETE   CONSTITUTIONAL: No fevers or chills  EYES AND ENT: No visual changes or no throat pain   NECK: No pain or stiffness  RESPIRATORY: No hemoptysis or shortness of breath  CARDIOVASCULAR: No chest pain or palpitations  GASTROINTESTINAL: No melena or hematochezia  GENITOURINARY: No dysuria or hematuria  NEUROLOGICAL: +As stated in HPI above  SKIN: No itching, burning, rashes, or lesions   All other review of systems is negative unless indicated above.    Allergies:  No Known Allergies      PMHx/PSHx/Family Hx: As above, otherwise see below   No pertinent past medical history        Social Hx:  No current use of tobacco, alcohol, or illicit drugs  Lives with ***    Medications:  MEDICATIONS  (STANDING):    MEDICATIONS  (PRN):      Vitals:  T(C): 36.7 (01-05-24 @ 14:10), Max: 36.9 (01-04-24 @ 18:03)  HR: 83 (01-05-24 @ 14:10) (81 - 83)  BP: 125/78 (01-05-24 @ 14:10) (125/78 - 129/71)  RR: 18 (01-05-24 @ 14:10) (16 - 18)  SpO2: 98% (01-05-24 @ 14:10) (98% - 100%)    Physical Examination: INCOMPLETE  General - NAD  Eyes -  Fundoscopy not well visualized    Neurologic Exam:  Mental status - Awake, Alert, Oriented to person, place, and time. Speech fluent, repetition and naming intact. Follows simple commands.  Cranial nerves - PERRL, VFF, Acuity 20/20 on R, and 20/20-3 on L, no red desaturation  EOMI, no nystagmus,  face sensation (V1-V3) intact b/l, facial strength intact without asymmetry b/l, hearing intact b/l, palate with symmetric elevation, trapezius  5/5 strength b/l, tongue midline on protrusion with full lateral movement    Motor - Normal bulk and tone throughout. No pronator drift.    Strength testing            Deltoid      Biceps      Triceps     Wrist Extension    Wrist Flexion     Interossei         R            5                 5               5                     5                              5                        5                 5  L             5                 5               5                     5                              5                        5                 5              Hip Flexion    Hip Extension    Knee Flexion    Knee Extension    Dorsiflexion    Plantar Flexion  R              5                           5                       5                           5                            5                          5  L              5                           5                        5                           5                            5                          5    Sensation - Light touch intact throughout    DTR's - brisk throughout             Biceps      Triceps     Brachioradialis      Patellar    Ankle    Toes/plantar response  R             2+             2+                  2+                2+            2+                 Down  L              2+             2+                 2+                2+           2+                  up    Coordination - Finger to Nose intact b/l. No tremors appreciated    Gait and station - Normal casual gait. Romberg (-)    Labs:                        14.9   7.58  )-----------( 345      ( 04 Jan 2024 16:30 )             44.3     01-04    136  |  100  |  15  ----------------------------<  107<H>  4.4   |  25  |  0.81    Ca    9.4      04 Jan 2024 16:30    TPro  9.0<H>  /  Alb  4.3  /  TBili  1.2  /  DBili  x   /  AST  12  /  ALT  12  /  AlkPhos  77  01-04    CAPILLARY BLOOD GLUCOSE        LIVER FUNCTIONS - ( 04 Jan 2024 16:30 )  Alb: 4.3 g/dL / Pro: 9.0 g/dL / ALK PHOS: 77 U/L / ALT: 12 U/L / AST: 12 U/L / GGT: x               CSF:                  Radiology:     Neurology - Consult Note    -  Spectra: [phone] ([facility]), [phone] ([facility])  -    HPI: Patient JAYE COYLE is a 63y (1960) man, no PMH but patient has not seen his PCP in 10 years, who presents to [facility] ED for blurry vision. Patient reports on Dec 29, he noticed a slight change in his vision, described as things being slightly out of focus, as though he needed better glasses. The next day, his vision worsened a little more, so he went to his ophthalmologist. At the ophthalmologist, he said his vision while looking at the letters would be sharp in the center and blurry or distended at the edges. Nothing was found on the exam and he was told he might need reading glasses. However, over last two weeks, he feels his vision has been worsening and every day he is seeing less and less. He can't quite make out his face in the bathroom mirror and he needs more and more light to see objects everyday. His focal vision in the center of his vision is still somewhat; but he has to look at writing one letter at a time to make things out, otherwise his letters blur together or overlap on top of each other. If his vision was a "10" in the beginning, it is now a "3." He is terrified that he is losing his vision and that he may no longer be able to see. He complains of a slight headache, but is trying to stay optimistic. Patient endorses smoking 1 pack every 3 days, has smoked for 40 years. He denies any alcohol use. Patient reports his vision is blurry regardless of whether one eye is closed or both are open and is the same either way.     FH: father had nonhodgkin lymphoma.   SH: smoking. denies alcohol use. denies drug use. lives with wide and daughter. works in [employer], but has been unable to work recently. He denies any f/c/n/v/d/c/u, changes in hearing, smell, taste, changes in gait, changes in speech.       Review of Systems:    CONSTITUTIONAL: No fevers or chills  EYES AND ENT: + visual changes, no throat pain   NECK: No pain or stiffness  RESPIRATORY: No hemoptysis or shortness of breath  CARDIOVASCULAR: No chest pain or palpitations  GASTROINTESTINAL: No melena or hematochezia  GENITOURINARY: No dysuria or hematuria  NEUROLOGICAL: +As stated in HPI above  SKIN: No itching, burning, rashes, or lesions      Allergies:  No Known Allergies      PMHx/PSHx/Family Hx: As above, otherwise see below   No pertinent past medical history           Medications:  MEDICATIONS  (STANDING):    MEDICATIONS  (PRN):      Vitals:  T(C): 36.7 (01-05-24 @ 14:10), Max: 36.9 (01-04-24 @ 18:03)  HR: 83 (01-05-24 @ 14:10) (81 - 83)  BP: 125/78 (01-05-24 @ 14:10) (125/78 - 129/71)  RR: 18 (01-05-24 @ 14:10) (16 - 18)  SpO2: 98% (01-05-24 @ 14:10) (98% - 100%)    Physical Examination:    General - NAD  Eyes -  Fundoscopy not well visualized    Neurologic Exam:  Mental status - Awake, Alert, Oriented to person, place, and time. Speech fluent, repetition and naming intact. Follows simple commands.  Cranial nerves - PERRL, VFF, Acuity 20/20 on R, and 20/20-3 on L, no red desaturation  EOMI, no nystagmus,  face sensation (V1-V3) intact b/l, facial strength intact without asymmetry b/l, hearing intact b/l, palate with symmetric elevation, trapezius  5/5 strength b/l, tongue midline on protrusion with full lateral movement    Motor - Normal bulk and tone throughout. No pronator drift.    Strength testing            Deltoid      Biceps      Triceps     Wrist Extension    Wrist Flexion     Interossei         R            5                 5               5                     5                              5                        5                 5  L             5                 5               5                     5                              5                        5                 5              Hip Flexion    Hip Extension    Knee Flexion    Knee Extension    Dorsiflexion    Plantar Flexion  R              5                           5                       5                           5                            5                          5  L              5                           5                        5                           5                            5                          5    Sensation - Light touch intact throughout    DTR's - brisk throughout, positive suprapatellars             Biceps      Triceps     Brachioradialis      Patellar    Ankle    Toes/plantar response  R             2+             2+                  2+                3+            2+                 Down  L              2+             2+                 2+                 3+           2+                  up    Coordination - Finger to Nose intact b/l. No tremors appreciated    Gait and station - Normal casual gait. able to ambulate on tiptoe, heel walk, slightly unsteady on tandem gait    Labs:                        14.9   7.58  )-----------( 345      ( 04 Jan 2024 16:30 )             44.3     01-04    136  |  100  |  15  ----------------------------<  107<H>  4.4   |  25  |  0.81    Ca    9.4      04 Jan 2024 16:30    TPro  9.0<H>  /  Alb  4.3  /  TBili  1.2  /  DBili  x   /  AST  12  /  ALT  12  /  AlkPhos  77  01-04    CAPILLARY BLOOD GLUCOSE        LIVER FUNCTIONS - ( 04 Jan 2024 16:30 )  Alb: 4.3 g/dL / Pro: 9.0 g/dL / ALK PHOS: 77 U/L / ALT: 12 U/L / AST: 12 U/L / GGT: x               CSF:         Radiology:     Neurology - Consult Note    -  Spectra: [phone] ([facility]), [phone] ([facility])  -    HPI: Patient JAYE COYLE is a 63y (1960) man, no PMH but patient has not seen his PCP in 10 years, who presents to [facility] ED for blurry vision. Patient reports on Dec 29, he noticed a slight change in his vision, described as things being slightly out of focus, as though he needed better glasses. The next day, his vision worsened a little more, so he went to his ophthalmologist. At the ophthalmologist, he said his vision while looking at the letters would be sharp in the center and blurry or distended at the edges. Nothing was found on the exam and he was told he might need reading glasses. However, over last two weeks, he feels his vision has been worsening and every day he is seeing less and less. He can't quite make out his face in the bathroom mirror and he needs more and more light to see objects everyday. His focal vision in the center of his vision is still somewhat; but he has to look at writing one letter at a time to make things out, otherwise his letters blur together or overlap on top of each other. If his vision was a "10" in the beginning, it is now a "3." He is terrified that he is losing his vision and that he may no longer be able to see. He complains of a slight headache, but is trying to stay optimistic. Patient endorses smoking 1 pack every 3 days, has smoked for 40 years. He denies any alcohol use. Patient reports his vision is blurry regardless of whether one eye is closed or both are open and is the same either way.     FH: father had nonhodgkin lymphoma.   SH: smoking. denies alcohol use. denies drug use. lives with wide and daughter. works in [employer], but has been unable to work recently. He denies any f/c/n/v/d/c/u, changes in hearing, smell, taste, changes in gait, changes in speech.       Review of Systems:    CONSTITUTIONAL: No fevers or chills  EYES AND ENT: + visual changes, no throat pain   NECK: No pain or stiffness  RESPIRATORY: No hemoptysis or shortness of breath  CARDIOVASCULAR: No chest pain or palpitations  GASTROINTESTINAL: No melena or hematochezia  GENITOURINARY: No dysuria or hematuria  NEUROLOGICAL: +As stated in HPI above  SKIN: No itching, burning, rashes, or lesions      Allergies:  No Known Allergies      PMHx/PSHx/Family Hx: As above, otherwise see below   No pertinent past medical history           Medications:  MEDICATIONS  (STANDING):    MEDICATIONS  (PRN):      Vitals:  T(C): 36.7 (01-05-24 @ 14:10), Max: 36.9 (01-04-24 @ 18:03)  HR: 83 (01-05-24 @ 14:10) (81 - 83)  BP: 125/78 (01-05-24 @ 14:10) (125/78 - 129/71)  RR: 18 (01-05-24 @ 14:10) (16 - 18)  SpO2: 98% (01-05-24 @ 14:10) (98% - 100%)    Physical Examination:    General - NAD  Eyes -  Fundoscopy not well visualized    Neurologic Exam:  Mental status - Awake, Alert, Oriented to person, place, and time. Speech fluent, repetition and naming intact. Follows simple commands.  Cranial nerves - PERRL, VFF, Acuity 20/20 on R, and 20/20-3 on L, no red desaturation  EOMI, no nystagmus,  face sensation (V1-V3) intact b/l, facial strength intact without asymmetry b/l, hearing intact b/l, palate with symmetric elevation, trapezius  5/5 strength b/l, tongue midline on protrusion with full lateral movement    Motor - Normal bulk and tone throughout. No pronator drift.    Strength testing            Deltoid      Biceps      Triceps     Wrist Extension    Wrist Flexion     Interossei         R            5                 5               5                     5                              5                        5                 5  L             5                 5               5                     5                              5                        5                 5              Hip Flexion    Hip Extension    Knee Flexion    Knee Extension    Dorsiflexion    Plantar Flexion  R              5                           5                       5                           5                            5                          5  L              5                           5                        5                           5                            5                          5    Sensation - Light touch intact throughout    DTR's - brisk throughout, positive suprapatellars             Biceps      Triceps     Brachioradialis      Patellar    Ankle    Toes/plantar response  R             2+             2+                  2+                3+            2+                 Down  L              2+             2+                 2+                 3+           2+                  up    Coordination - Finger to Nose intact b/l. No tremors appreciated    Gait and station - Normal casual gait. able to ambulate on tiptoe, heel walk, slightly unsteady on tandem gait    Labs:                        14.9   7.58  )-----------( 345      ( 04 Jan 2024 16:30 )             44.3     01-04    136  |  100  |  15  ----------------------------<  107<H>  4.4   |  25  |  0.81    Ca    9.4      04 Jan 2024 16:30    TPro  9.0<H>  /  Alb  4.3  /  TBili  1.2  /  DBili  x   /  AST  12  /  ALT  12  /  AlkPhos  77  01-04    CAPILLARY BLOOD GLUCOSE        LIVER FUNCTIONS - ( 04 Jan 2024 16:30 )  Alb: 4.3 g/dL / Pro: 9.0 g/dL / ALK PHOS: 77 U/L / ALT: 12 U/L / AST: 12 U/L / GGT: x               CSF:         Radiology:     Neurology - Consult Note    -  Spectra: [phone] ([facility]), [phone] ([facility]  -    HPI: Patient JAYE COYLE is a 63y (1960) man, no PMH but patient has not seen his PCP in 10 years, who presents to [facility] ED for blurry vision. Patient reports on Dec 29, he noticed a slight change in his vision, described as things being slightly out of focus, as though he needed better glasses. The next day, his vision worsened a little more, so he went to his ophthalmologist. At the ophthalmologist, he said his vision while looking at the letters would be sharp in the center and blurry or distended at the edges. Nothing was found on the exam and he was told he might need reading glasses. However, over last two weeks, he feels his vision has been worsening and every day he is seeing less and less. He can't quite make out his face in the bathroom mirror and he needs more and more light to see objects everyday. His focal vision in the center of his vision is still somewhat; but he has to look at writing one letter at a time to make things out, otherwise his letters blur together or overlap on top of each other. If his vision was a "10" in the beginning, it is now a "3." He is terrified that he is losing his vision and that he may no longer be able to see. He complains of a slight headache, but is trying to stay optimistic. Patient endorses smoking 1 pack every 3 days, has smoked for 40 years. He denies any alcohol use. Patient reports his vision is blurry regardless of whether one eye is closed or both are open and is the same either way.     FH: father had non-hodgkin lymphoma.   SH: smoking. denies alcohol use. denies drug use. lives with wide and daughter. works in [employer], but has been unable to work recently. He denies any f/c/n/v/d/c/u, changes in hearing, smell, taste, changes in gait, changes in speech.       Review of Systems:    CONSTITUTIONAL: No fevers or chills  EYES AND ENT: + visual changes, no throat pain   NECK: No pain or stiffness  RESPIRATORY: No hemoptysis or shortness of breath  CARDIOVASCULAR: No chest pain or palpitations  GASTROINTESTINAL: No melena or hematochezia  GENITOURINARY: No dysuria or hematuria  NEUROLOGICAL: +As stated in HPI above  SKIN: No itching, burning, rashes, or lesions      Allergies:  No Known Allergies      PMHx/PSHx/Family Hx: As above, otherwise see below   No pertinent past medical history           Medications:  MEDICATIONS  (STANDING):    MEDICATIONS  (PRN):      Vitals:  T(C): 36.7 (01-05-24 @ 14:10), Max: 36.9 (01-04-24 @ 18:03)  HR: 83 (01-05-24 @ 14:10) (81 - 83)  BP: 125/78 (01-05-24 @ 14:10) (125/78 - 129/71)  RR: 18 (01-05-24 @ 14:10) (16 - 18)  SpO2: 98% (01-05-24 @ 14:10) (98% - 100%)    Physical Examination:    General - NAD  Eyes -  Fundoscopy not well visualized    Neurologic Exam:  Mental status - Awake, Alert, Oriented to person, place, and time. Speech fluent, repetition and naming intact. Follows simple commands.  Cranial nerves - PERRL, VFF, Acuity 20/20 on R, and 20/20-3 on L, no red desaturation  EOMI, no nystagmus,  face sensation (V1-V3) intact b/l, facial strength intact without asymmetry b/l, hearing intact b/l, palate with symmetric elevation, trapezius  5/5 strength b/l, tongue midline on protrusion with full lateral movement    Motor - Normal bulk and tone throughout. No pronator drift.    Strength testing            Deltoid      Biceps      Triceps     Wrist Extension    Wrist Flexion     Interossei         R            5                 5               5                     5                              5                        5                 5  L             5                 5               5                     5                              5                        5                 5              Hip Flexion    Hip Extension    Knee Flexion    Knee Extension    Dorsiflexion    Plantar Flexion  R              5                           5                       5                           5                            5                          5  L              5                           5                        5                           5                            5                          5    Sensation - Light touch intact throughout    DTR's - brisk throughout, positive suprapatellars             Biceps      Triceps     Brachioradialis      Patellar    Ankle    Toes/plantar response  R             2+             2+                  2+                3+            2+                 Down  L              2+             2+                 2+                 3+           2+                  up    Coordination - Finger to Nose intact b/l. No tremors appreciated    Gait and station - Normal casual gait. able to ambulate on tiptoe, heel walk, slightly unsteady on tandem gait    Labs:                        14.9   7.58  )-----------( 345      ( 04 Jan 2024 16:30 )             44.3     01-04    136  |  100  |  15  ----------------------------<  107<H>  4.4   |  25  |  0.81    Ca    9.4      04 Jan 2024 16:30    TPro  9.0<H>  /  Alb  4.3  /  TBili  1.2  /  DBili  x   /  AST  12  /  ALT  12  /  AlkPhos  77  01-04    CAPILLARY BLOOD GLUCOSE        LIVER FUNCTIONS - ( 04 Jan 2024 16:30 )  Alb: 4.3 g/dL / Pro: 9.0 g/dL / ALK PHOS: 77 U/L / ALT: 12 U/L / AST: 12 U/L / GGT: x               CSF:         Radiology:     Neurology - Consult Note    -  Spectra: [phone] ([facility]), [phone] ([facility])  -    HPI: Patient JAYE COYLE is a 63y (1960) man, no PMH but patient has not seen his PCP in 10 years, who presents to [facility] ED for blurry vision. Patient reports on Dec 29, he noticed a slight change in his vision, described as things being slightly out of focus, as though he needed better glasses. The next day, his vision worsened a little more, so he went to his ophthalmologist. At the ophthalmologist, he said his vision while looking at the letters would be sharp in the center and blurry or distended at the edges. Nothing was found on the exam and he was told he might need reading glasses. However, over last two weeks, he feels his vision has been worsening and every day he is seeing less and less. He can't quite make out his face in the bathroom mirror and he needs more and more light to see objects everyday. His focal vision in the center of his vision is still somewhat; but he has to look at writing one letter at a time to make things out, otherwise his letters blur together or overlap on top of each other. If his vision was a "10" in the beginning, it is now a "3." He is terrified that he is losing his vision and that he may no longer be able to see. He complains of a slight headache, but is trying to stay optimistic. Patient endorses smoking 1 pack every 3 days, has smoked for 40 years. He denies any alcohol use. Patient reports his vision is blurry regardless of whether one eye is closed or both are open and is the same either way.     FH: father had non-hodgkin lymphoma.   SH: smoking. denies alcohol use. denies drug use. lives with wide and daughter. works in [employer], but has been unable to work recently. He denies any f/c/n/v/d/c/u, changes in hearing, smell, taste, changes in gait, changes in speech.       Review of Systems:    CONSTITUTIONAL: No fevers or chills  EYES AND ENT: + visual changes, no throat pain   NECK: No pain or stiffness  RESPIRATORY: No hemoptysis or shortness of breath  CARDIOVASCULAR: No chest pain or palpitations  GASTROINTESTINAL: No melena or hematochezia  GENITOURINARY: No dysuria or hematuria  NEUROLOGICAL: +As stated in HPI above  SKIN: No itching, burning, rashes, or lesions      Allergies:  No Known Allergies      PMHx/PSHx/Family Hx: As above, otherwise see below   No pertinent past medical history           Medications:  MEDICATIONS  (STANDING):    MEDICATIONS  (PRN):      Vitals:  T(C): 36.7 (01-05-24 @ 14:10), Max: 36.9 (01-04-24 @ 18:03)  HR: 83 (01-05-24 @ 14:10) (81 - 83)  BP: 125/78 (01-05-24 @ 14:10) (125/78 - 129/71)  RR: 18 (01-05-24 @ 14:10) (16 - 18)  SpO2: 98% (01-05-24 @ 14:10) (98% - 100%)    Physical Examination:    General - NAD  Eyes -  Fundoscopy not well visualized    Neurologic Exam:  Mental status - Awake, Alert, Oriented to person, place, and time. Speech fluent, repetition and naming intact. Follows simple commands.  Cranial nerves - PERRL, VFF, Acuity 20/20 on R, and 20/20-3 on L, no red desaturation  EOMI, no nystagmus,  face sensation (V1-V3) intact b/l, facial strength intact without asymmetry b/l, hearing intact b/l, palate with symmetric elevation, trapezius  5/5 strength b/l, tongue midline on protrusion with full lateral movement    Motor - Normal bulk and tone throughout. No pronator drift.    Strength testing            Deltoid      Biceps      Triceps     Wrist Extension    Wrist Flexion     Interossei         R            5                 5               5                     5                              5                        5                 5  L             5                 5               5                     5                              5                        5                 5              Hip Flexion    Hip Extension    Knee Flexion    Knee Extension    Dorsiflexion    Plantar Flexion  R              5                           5                       5                           5                            5                          5  L              5                           5                        5                           5                            5                          5    Sensation - Light touch intact throughout    DTR's - brisk throughout, positive suprapatellars             Biceps      Triceps     Brachioradialis      Patellar    Ankle    Toes/plantar response  R             2+             2+                  2+                3+            2+                 Down  L              2+             2+                 2+                 3+           2+                  up    Coordination - Finger to Nose intact b/l. No tremors appreciated    Gait and station - Normal casual gait. able to ambulate on tiptoe, heel walk, slightly unsteady on tandem gait    Labs:                        14.9   7.58  )-----------( 345      ( 04 Jan 2024 16:30 )             44.3     01-04    136  |  100  |  15  ----------------------------<  107<H>  4.4   |  25  |  0.81    Ca    9.4      04 Jan 2024 16:30    TPro  9.0<H>  /  Alb  4.3  /  TBili  1.2  /  DBili  x   /  AST  12  /  ALT  12  /  AlkPhos  77  01-04    CAPILLARY BLOOD GLUCOSE        LIVER FUNCTIONS - ( 04 Jan 2024 16:30 )  Alb: 4.3 g/dL / Pro: 9.0 g/dL / ALK PHOS: 77 U/L / ALT: 12 U/L / AST: 12 U/L / GGT: x               CSF:         Radiology:

## 2024-01-05 NOTE — ED PROVIDER NOTE - ATTENDING APP SHARED VISIT CONTRIBUTION OF CARE
Seen and examined, have discussed plan of care with PA. Pt. with change in vision, blurred and diminished peripheral vision, "tunnel" vision, no diplopia, no HA, no trauma. Pt denies dizziness/lightheadedness, no CP/N/V/SOB/diaphoresis, ambulates easily.

## 2024-01-05 NOTE — ED ADULT NURSE NOTE - NSFALLUNIVINTERV_ED_ALL_ED
Bed/Stretcher in lowest position, wheels locked, appropriate side rails in place/Call bell, personal items and telephone in reach/Instruct patient to call for assistance before getting out of bed/chair/stretcher/Non-slip footwear applied when patient is off stretcher/[place] to call system/Physically safe environment - no spills, clutter or unnecessary equipment/Purposeful proactive rounding/Room/bathroom lighting operational, light cord in reach Bed/Stretcher in lowest position, wheels locked, appropriate side rails in place/Call bell, personal items and telephone in reach/Instruct patient to call for assistance before getting out of bed/chair/stretcher/Non-slip footwear applied when patient is off stretcher/[place] to call system/Physically safe environment - no spills, clutter or unnecessary equipment/Purposeful proactive rounding/Room/bathroom lighting operational, light cord in reach

## 2024-01-05 NOTE — ED PROVIDER NOTE - OBJECTIVE STATEMENT
64 y/o M states 63-year-old male no past medical history presents to the ED with continued B/L blurry vision now worsening describing it as 'tunnel like".  States his peripheral vision is blackened bilaterally and the letters that are in front of him centrally are blurring on top of each other.  Seen by ophthalmologist Geronimo Schaffer 3 days ago and told "he had a small left retinal tear ".  Denies fever chills, headache, dizziness, floaters, flashing lights, curtain, sensation, eye pain, chest pain, shortness of breath, abdominal pain, nausea, vomiting, diarrhea, weakness, numbness, tingling.  Seen here in ED yesterday where CT head was negative and labs stable.  Advised to follow-up with ophthalmology outpatient. Called Dr. Schaffer who advised ED evaluation. 63-year-old male no past medical history presents to the ED with continued B/L blurry vision now worsening describing it as 'tunnel like".  States his peripheral vision is blackened bilaterally and the letters that are in front of him centrally are blurring on top of each other.  Seen by ophthalmologist Geronimo Shcaffer 3 days ago and told "he had a small left retinal tear ".  Denies fever chills, headache, dizziness, floaters, flashing lights, curtain, sensation, eye pain, chest pain, shortness of breath, abdominal pain, nausea, vomiting, diarrhea, weakness, numbness, tingling.  Seen here in ED yesterday where CT head was negative and labs stable.  Advised to follow-up with ophthalmology outpatient. Called Dr. Schaffer who advised ED evaluation.

## 2024-01-05 NOTE — ED ADULT NURSE NOTE - OBJECTIVE STATEMENT
Pt received intake a&ox4, ambulatory. Pt c/o worsening vision stating "Its getting darker an darker." pt seen here in ED yesterday where CT head was negative and labs stable.  Advised to follow-up with ophthalmology outpatient. Pt denies chest pain, sob, n+v, headache, dizziness. Breathing even, unlabored. 20g IV placed to right ac, labs collected and sent.

## 2024-01-05 NOTE — ED ADULT NURSE NOTE - NS ED NURSE RECORD ANOTHER VITAL SIGN
Discussed w/LO and she said to refer to cardiology and then mom can just schedule his 6 month wcc with her next month.    
Discussed w/mom that LO approved referral to cardiology and gave mom their contact information so she can call and arrange that apt now; also discussed that she can schedule a 6 month wcc for [place] after 9/1/24 to establish care here.  Mom agrees w/plan and  to schedule an apt.  Placed cardiology referral.  Reviewed [facility] 4 month wcc visit note and it was noted that pt has a holosystolic heart murmur present.  FYI and can sign encounter to close.      
Msg from mom, [facility], [phone].  Mom wants to transfer service from [facility] to our office and she has questions b/c [place] has a heart murmur.  
Spoke to mom and she said she wants to establish care in our office for [place] b/c where they've been going at [facility] is too far from their home.  Call was transferred to triage b/c mom mentioned that he has a heart murmur.  Mom said they were supposed to see cardiology on 8/10/24 to see if the hole closed, but the cardiology office doesn't accept their insurance.  She doesn't drive, but dad does and has to take them to all of his apts so mom was trying to establish care somewhere close to their home in [place].  Discussed that we can see him here for well and sick visits but the cardiology apts may be downtown.  Mom understands and is okay with this b/c [facility] accepts their insurance.  Discussed w/mom that since he's already had his 4 month wcc at [facility] on 7/22/24, I'll ask one of the doctor's here how to go about this and call mom back.   Can he be seen here for a sick visit to establish care and be referred to cardiology for evaluation?  Please advise.  
Yes

## 2024-01-06 VITALS
RESPIRATION RATE: 18 BRPM | OXYGEN SATURATION: 95 % | SYSTOLIC BLOOD PRESSURE: 104 MMHG | TEMPERATURE: 98 F | HEART RATE: 73 BPM | DIASTOLIC BLOOD PRESSURE: 65 MMHG

## 2024-01-06 LAB
T PALLIDUM AB TITR SER: NEGATIVE — SIGNIFICANT CHANGE UP
T PALLIDUM AB TITR SER: NEGATIVE — SIGNIFICANT CHANGE UP

## 2024-01-06 PROCEDURE — 99239 HOSP IP/OBS DSCHRG MGMT >30: CPT

## 2024-01-06 PROCEDURE — 70553 MRI BRAIN STEM W/O & W/DYE: CPT | Mod: 26,MA

## 2024-01-06 PROCEDURE — 99283 EMERGENCY DEPT VISIT LOW MDM: CPT

## 2024-01-06 RX ORDER — DIAZEPAM 5 MG
2 TABLET ORAL ONCE
Refills: 0 | Status: DISCONTINUED | OUTPATIENT
Start: 2024-01-06 | End: 2024-01-06

## 2024-01-06 RX ADMIN — Medication 2 MILLIGRAM(S): at 12:32

## 2024-01-06 NOTE — ED CDU PROVIDER DISPOSITION NOTE - ATTENDING CONTRIBUTION TO CARE
I have personally performed a face to face medical and diagnostic evaluation of the patient. I have discussed with and reviewed the FRANCK's note and agree with the History, ROS, Physical Exam and MDM unless otherwise indicated. A brief summary of my personal evaluation and impression can be found below. I actively participated in the comanagement of this patient with the FRANCK. I have personally reviewed all orders, study/imaging results, medication orders. I discussed indications for consultant evaluation and consultant recommendations with the FRANCK when applicable, and have discussed the disposition plan with the FRANCK.    Please see CDU Subsequent day note for further details.

## 2024-01-06 NOTE — ED CDU PROVIDER DISPOSITION NOTE - NSFOLLOWUPINSTRUCTIONS_ED_ALL_ED_FT
Follow-up with neuro-ophthalmologist Dr. Mart within 1 week, office information listed above please call to make an appointment  Follow-up with your primary care doctor within 1 week  Follow up with opthalmology within 1 week, [facility] Department of Ophthalmology located at  [address] [place] [address]. [place] [address], [phone]  Return to the ER with any worsening or concerning symptoms, worsening vision changes, headache, dizziness, difficulty walking, numbness/tingling, weakness or any other concerns. Follow-up with neuro-ophthalmologist Dr. Mart within 1 week, office information listed above please call to make an appointment  Follow-up with your primary care doctor within 1 week  Follow up with opthalmology within 1 week, [facility] Department of Ophthalmology located at  [address] [place] [address]. [place] [address], [phone]  Return to the ER with any worsening or concerning symptoms, worsening vision changes, headache, dizziness, difficulty walking, numbness/tingling, weakness or any other concerns.

## 2024-01-06 NOTE — ED CDU PROVIDER DISPOSITION NOTE - CARE PROVIDER_API CALL
Alexis Mart  Ophthalmology  [address] [place] [address], [facility] [address]  [place] [address]  Phone: [phone]  Fax: [phone]  Follow Up Time:    Alexis Mart  Ophthalmology  [address] [place] [address], [facility] [address]  [place] [address]  Phone: [phone]  Fax: [phone]  Follow Up Time:

## 2024-01-06 NOTE — ED ADULT NURSE REASSESSMENT NOTE - NS ED NURSE REASSESS COMMENT FT1
Break RN: Patient awake and resting in stretcher eating lunch with family at bedside; respirations even and unlabored, no signs/symptoms of acute distress. Patient denies dyspnea, shortness of breath, and chest pain. Patient denies pain and offers no complaints at this time. Patient educated on premedication prior to MRI; patient verbalized understanding. Medications administered per eMAR. Safety measures in place and call bell within reach.

## 2024-01-06 NOTE — ED CDU PROVIDER DISPOSITION NOTE - PATIENT PORTAL LINK FT
You can access the FollowMyHealth Patient Portal offered by [facility] by registering at the following website: [email or web address]. By joining [employer] [facility]’s FollowMyHealth portal, you will also be able to view your health information using other applications (apps) compatible with our system. You can access the FollowMyHealth Patient Portal offered by [facility] by registering at the following website: [email or web address]. By joining [employer]’s FollowMyHealth portal, you will also be able to view your health information using other applications (apps) compatible with our system.

## 2024-01-06 NOTE — ED CDU PROVIDER DISPOSITION NOTE - CLINICAL COURSE
63-year-old male with no past medical history presented to the ED with worsening "tunnellike" vision loss that began on 12/30.  Patient was seen by an outpatient optometrist and ophthalmologist.  Given persistent symptoms patient presented to the ER.  Patient evaluated by ophthalmology, feel likely cataracts patient can follow-up as an outpatient.  Patient seen by neurology who recommended MRI.  Patient placed in CDU for MRI, MRI showing. 63-year-old male with no past medical history presented to the ED with worsening "tunnellike" vision loss that began on 12/30.  Patient was seen by an outpatient optometrist and ophthalmologist.  Given persistent symptoms patient presented to the ER.  Patient evaluated by ophthalmology, feel likely cataracts patient can follow-up as an outpatient.  Patient seen by neurology who recommended MRI.  Patient placed in CDU for MRI, MRI resulted without acute findings, spoke with neuro recommending outpatient neuro ophtho follow-up with Dr. Mart.  Discussed all results with patient.  Patient will return to the ER with any worsening concerning symptoms patient is well-appearing vital stable at discharge.

## 2024-01-06 NOTE — ED CDU PROVIDER SUBSEQUENT DAY NOTE - HISTORY
63-year-old male no past medical history presents to the ED with continued B/L blurry vision now worsening describing it as 'tunnel like".  States his peripheral vision is blackened bilaterally and the letters that are in front of him centrally are blurring on top of each other.  Seen by ophthalmologist Geronimo Schaffer 3 days ago and told "he had a small left retinal tear ".  Pt seen by Optho and by Neuro in ED. Recommends labs (see Neuro consult note), MRI brain w/ and wo contrast "r/o occipital lobe pathology".    Interval hx- Ophtho believes more likely cataracts no acute intervention warranted. Pt remains stable in no distress awaiting MRI.

## 2024-01-06 NOTE — ED CDU PROVIDER DISPOSITION NOTE - CARE PROVIDERS DIRECT ADDRESSES
[email or web address]

## 2024-01-06 NOTE — ED CDU PROVIDER SUBSEQUENT DAY NOTE - PROGRESS NOTE DETAILS
Patient signed out to me awaiting MRI, patient reassessed continues to report persistent vision changes.  Ophthalmology evaluated patient and cleared for outpatient follow-up.  Neurology following as well. MRI resulted without acute findings, spoke with neuro recommending outpatient neuro ophtho follow-up with Dr. Mart.  Discussed all results with patient.  Patient will return to the ER with any worsening concerning symptoms patient is well-appearing vital stable at discharge.  Discharge discussed with CDU attending.

## 2024-01-06 NOTE — ED CDU PROVIDER SUBSEQUENT DAY NOTE - ATTENDING APP SHARED VISIT CONTRIBUTION OF CARE
I have personally performed a face to face medical and diagnostic evaluation of the patient. I have discussed with and reviewed the FRANCK's note and agree with the History, ROS, Physical Exam and MDM unless otherwise indicated. A brief summary of my personal evaluation and impression can be found below. I actively participated in the comanagement of this patient with the FRANCK. I have personally reviewed all orders, study/imaging results, medication orders. I discussed indications for consultant evaluation and consultant recommendations with the FRANCK when applicable, and have discussed the disposition plan with the FRANCK.    Zak PINA: 63-year-old male no past medical history presents to the ED with continued B/L blurry vision now worsening describing it as 'tunnel like".  States his peripheral vision is blackened bilaterally and the letters that are in front of him centrally are blurring on top of each other.  Seen by ophthalmologist Geronimo Schaffer 3 days ago and told "he had a small left retinal tear ".  Pt seen by Optho and by Neuro in ED. Recommends labs (see Neuro consult note), MRI brain w/ and wo contrast "r/o occipital lobe pathology".    No acute events overnight. Labs and imaging reviewed. During morning rounds pt reports no acute complaints. Pt still having visual changes, no worse, notes are particularly worse with focusing, he also notes that his vision improves as he moves his eyes, they become more blurry with a steady gaze.     All other ROS negative, except as above and as per HPI and ROS section.    VITALS: Initial triage and subsequent vitals have been reviewed by me.  GEN APPEARANCE: Alert, non-toxic, well-appearing, NAD.  HEAD: Atraumatic.  EYES: PERRLa, EOMI, vision grossly intact.   NECK: Supple  CV: RRR, S1S2, no c/r/m/g. Cap refill <2 seconds. No bruits.   LUNGS: CTAB. No abnormal breath sounds.  ABDOMEN: Soft, NTND. No guarding or rebound.   MSK/EXT: No spinal or extremity point tenderness. No CVA ttp. Pelvis stable. No peripheral edema.  NEURO: Alert, follows commands. Weight bearing normal. Speech normal. Sensation and motor normal x4 extremities. CN2-12 normal, coordination normal, ambulating normally. UE & LE 5/5 b/l.  SKIN: Warm, dry and intact. No rash.  PSYCH: Appropriate    Plan/MDM: exam vss non toxic PE as above ddx unclear etiology of pts vision disturbances labs and imaging reviewed, Denies numbness, tingling or loss of sensation. Denies loss of motor function.  seen by both neuro and optho, pt pending MRI will continue to monitor in CDU, reassess after MRI

## 2024-01-08 LAB
ACRM BINDING ANTIBODY: <0.03 NMOL/L — SIGNIFICANT CHANGE UP (ref 0–0.24)
ACRM BINDING ANTIBODY: <0.03 NMOL/L — SIGNIFICANT CHANGE UP (ref 0–0.24)

## 2024-01-09 LAB
A-TOCOPHEROL VIT E SERPL-MCNC: 10.3 MG/L — SIGNIFICANT CHANGE UP (ref 9–29)
A-TOCOPHEROL VIT E SERPL-MCNC: 10.3 MG/L — SIGNIFICANT CHANGE UP (ref 9–29)
BETA+GAMMA TOCOPHEROL SERPL-MCNC: 0.3 MG/L — LOW (ref 0.5–4.9)
BETA+GAMMA TOCOPHEROL SERPL-MCNC: 0.3 MG/L — LOW (ref 0.5–4.9)
VIT A SERPL-MCNC: 47.3 UG/DL — SIGNIFICANT CHANGE UP (ref 22–69.5)
VIT A SERPL-MCNC: 47.3 UG/DL — SIGNIFICANT CHANGE UP (ref 22–69.5)

## 2024-01-10 ENCOUNTER — NON-APPOINTMENT (OUTPATIENT)
Age: 64
End: 2024-01-10

## 2024-01-10 ENCOUNTER — APPOINTMENT (OUTPATIENT)
Dept: OPHTHALMOLOGY | Facility: CLINIC | Age: 64
End: 2024-01-10
Payer: COMMERCIAL

## 2024-01-10 PROCEDURE — 99204 OFFICE O/P NEW MOD 45 MIN: CPT

## 2024-01-10 PROCEDURE — 92083 EXTENDED VISUAL FIELD XM: CPT

## 2024-01-16 LAB — MUSK IGG SER IA-MCNC: SIGNIFICANT CHANGE UP

## 2024-01-19 ENCOUNTER — APPOINTMENT (OUTPATIENT)
Dept: INTERNAL MEDICINE | Facility: CLINIC | Age: 64
End: 2024-01-19
Payer: COMMERCIAL

## 2024-01-19 ENCOUNTER — NON-APPOINTMENT (OUTPATIENT)
Age: 64
End: 2024-01-19

## 2024-01-19 VITALS
SYSTOLIC BLOOD PRESSURE: 110 MMHG | HEART RATE: 87 BPM | OXYGEN SATURATION: 99 % | TEMPERATURE: 98.2 F | HEIGHT: 71 IN | WEIGHT: 170 LBS | DIASTOLIC BLOOD PRESSURE: 70 MMHG | BODY MASS INDEX: 23.8 KG/M2

## 2024-01-19 DIAGNOSIS — Z12.5 ENCOUNTER FOR SCREENING FOR MALIGNANT NEOPLASM OF PROSTATE: ICD-10-CM

## 2024-01-19 DIAGNOSIS — Z13.228 ENCOUNTER FOR SCREENING FOR OTHER METABOLIC DISORDERS: ICD-10-CM

## 2024-01-19 PROCEDURE — 99204 OFFICE O/P NEW MOD 45 MIN: CPT

## 2024-01-19 PROCEDURE — 93000 ELECTROCARDIOGRAM COMPLETE: CPT

## 2024-01-19 NOTE — HISTORY OF PRESENT ILLNESS
[FreeTextEntry8] : 63 year old male here with complaints of vision disturbances since 12/30/23.  He has been evaluated in the emergency room, by an opthalmologist, by a neuro-ophtalmologist and an optometrist but his visual loss is still not explained.  He describes the inability to focus his eyes for far away distances.  He reports slight improvement in symptoms in the last week.  He denies any chest pain, shortness of breath or cough currently. Initial (On Arrival)

## 2024-01-19 NOTE — HEALTH RISK ASSESSMENT
[No] : No [No falls in past year] : Patient reported no falls in the past year [0] : 2) Feeling down, depressed, or hopeless: Not at all (0) [Current] : Current [20 or more] : 20 or more

## 2024-01-19 NOTE — PLAN
[FreeTextEntry1] : Visual disturbance - has been evaluated by ophthalmogy and neuro-ophthalmology and w/u has been negative.  Will refer to neurology as well. Tobacco use disorder - will refer for CT chest HMT - declines cancer screening for colon cancer but agreeable for PSA screening   f/u in 2-4 weeks

## 2024-01-19 NOTE — PHYSICAL EXAM
[No Acute Distress] : no acute distress [Normal Sclera/Conjunctiva] : normal sclera/conjunctiva [Normal Outer Ear/Nose] : the outer ears and nose were normal in appearance [No JVD] : no jugular venous distention [No Respiratory Distress] : no respiratory distress  [No Accessory Muscle Use] : no accessory muscle use [Clear to Auscultation] : lungs were clear to auscultation bilaterally [Normal Rate] : normal rate  [Regular Rhythm] : with a regular rhythm [Normal S1, S2] : normal S1 and S2 [No Edema] : there was no peripheral edema [Soft] : abdomen soft [Normal Anterior Cervical Nodes] : no anterior cervical lymphadenopathy [No CVA Tenderness] : no CVA  tenderness [No Spinal Tenderness] : no spinal tenderness [No Joint Swelling] : no joint swelling [No Rash] : no rash

## 2024-01-19 NOTE — REVIEW OF SYSTEMS
[Dryness] : dryness [Vision Problems] : vision problems [Joint Pain] : joint pain [Back Pain] : back pain [Fever] : no fever [Night Sweats] : no night sweats [Discharge] : no discharge [Pain] : no pain [Redness] : no redness [Itching] : no itching [Earache] : no earache [Nasal Discharge] : no nasal discharge [Chest Pain] : no chest pain [Orthopena] : no orthopnea [Shortness Of Breath] : no shortness of breath [Abdominal Pain] : no abdominal pain [Vomiting] : no vomiting [Dysuria] : no dysuria [Hematuria] : no hematuria [Skin Rash] : no skin rash [Headache] : no headache [Memory Loss] : no memory loss [Suicidal] : not suicidal [Easy Bleeding] : no easy bleeding

## 2024-01-22 LAB
ALBUMIN SERPL ELPH-MCNC: 4.4 G/DL
ALP BLD-CCNC: 77 U/L
ALT SERPL-CCNC: 14 U/L
ANION GAP SERPL CALC-SCNC: 12 MMOL/L
APPEARANCE: CLEAR
AST SERPL-CCNC: 17 U/L
BACTERIA: NEGATIVE /HPF
BILIRUB DIRECT SERPL-MCNC: 0.3 MG/DL
BILIRUB INDIRECT SERPL-MCNC: 0.9 MG/DL
BILIRUB SERPL-MCNC: 1.2 MG/DL
BILIRUBIN URINE: NEGATIVE
BLOOD URINE: NEGATIVE
BUN SERPL-MCNC: 16 MG/DL
CALCIUM SERPL-MCNC: 10.1 MG/DL
CAST: 0 /LPF
CHLORIDE SERPL-SCNC: 102 MMOL/L
CHOLEST SERPL-MCNC: 154 MG/DL
CO2 SERPL-SCNC: 26 MMOL/L
COLOR: NORMAL
CREAT SERPL-MCNC: 0.86 MG/DL
EGFR: 98 ML/MIN/1.73M2
EPITHELIAL CELLS: 0 /HPF
ESTIMATED AVERAGE GLUCOSE: 80 MG/DL
GLUCOSE QUALITATIVE U: NEGATIVE MG/DL
GLUCOSE SERPL-MCNC: 68 MG/DL
HBA1C MFR BLD HPLC: 4.4 %
HCT VFR BLD CALC: 45.8 %
HDLC SERPL-MCNC: 57 MG/DL
HGB BLD-MCNC: 15.1 G/DL
KETONES URINE: ABNORMAL MG/DL
LDLC SERPL CALC-MCNC: 86 MG/DL
LEUKOCYTE ESTERASE URINE: NEGATIVE
MCHC RBC-ENTMCNC: 33 GM/DL
MCHC RBC-ENTMCNC: 34.1 PG
MCV RBC AUTO: 103.4 FL
MICROSCOPIC-UA: NORMAL
NITRITE URINE: NEGATIVE
NONHDLC SERPL-MCNC: 97 MG/DL
PH URINE: 6
PLATELET # BLD AUTO: 337 K/UL
POTASSIUM SERPL-SCNC: 3.9 MMOL/L
PROT SERPL-MCNC: 8.9 G/DL
PROTEIN URINE: NEGATIVE MG/DL
PSA FREE FLD-MCNC: 28 %
PSA FREE SERPL-MCNC: 0.3 NG/ML
PSA SERPL-MCNC: 1.08 NG/ML
RBC # BLD: 4.43 M/UL
RBC # FLD: 12.1 %
RED BLOOD CELLS URINE: 1 /HPF
SODIUM SERPL-SCNC: 140 MMOL/L
SPECIFIC GRAVITY URINE: 1.02
TRIGL SERPL-MCNC: 52 MG/DL
TSH SERPL-ACNC: 0.71 UIU/ML
UROBILINOGEN URINE: 1 MG/DL
WBC # FLD AUTO: 6.87 K/UL
WHITE BLOOD CELLS URINE: 0 /HPF

## 2024-01-29 ENCOUNTER — APPOINTMENT (OUTPATIENT)
Dept: INTERNAL MEDICINE | Facility: CLINIC | Age: 64
End: 2024-01-29
Payer: COMMERCIAL

## 2024-01-29 ENCOUNTER — LABORATORY RESULT (OUTPATIENT)
Age: 64
End: 2024-01-29

## 2024-01-29 VITALS
TEMPERATURE: 98 F | HEART RATE: 71 BPM | DIASTOLIC BLOOD PRESSURE: 70 MMHG | WEIGHT: 170 LBS | SYSTOLIC BLOOD PRESSURE: 110 MMHG | OXYGEN SATURATION: 99 % | BODY MASS INDEX: 23.8 KG/M2 | HEIGHT: 71 IN

## 2024-01-29 DIAGNOSIS — R77.8 OTHER SPECIFIED ABNORMALITIES OF PLASMA PROTEINS: ICD-10-CM

## 2024-01-29 DIAGNOSIS — H53.9 UNSPECIFIED VISUAL DISTURBANCE: ICD-10-CM

## 2024-01-29 DIAGNOSIS — R53.83 OTHER FATIGUE: ICD-10-CM

## 2024-01-29 DIAGNOSIS — F17.200 NICOTINE DEPENDENCE, UNSPECIFIED, UNCOMPLICATED: ICD-10-CM

## 2024-01-29 DIAGNOSIS — Z11.3 ENCOUNTER FOR SCREENING FOR INFECTIONS WITH A PREDOMINANTLY SEXUAL MODE OF TRANSMISSION: ICD-10-CM

## 2024-01-29 PROCEDURE — G2211 COMPLEX E/M VISIT ADD ON: CPT

## 2024-01-29 PROCEDURE — 99214 OFFICE O/P EST MOD 30 MIN: CPT

## 2024-01-31 LAB
ALBUMIN SERPL ELPH-MCNC: 4.4 G/DL
ALP BLD-CCNC: 74 U/L
ALT SERPL-CCNC: 16 U/L
ANION GAP SERPL CALC-SCNC: 10 MMOL/L
AST SERPL-CCNC: 22 U/L
BASOPHILS # BLD AUTO: 0.04 K/UL
BASOPHILS NFR BLD AUTO: 0.6 %
BILIRUB DIRECT SERPL-MCNC: 0.3 MG/DL
BILIRUB INDIRECT SERPL-MCNC: 0.6 MG/DL
BILIRUB SERPL-MCNC: 0.8 MG/DL
BUN SERPL-MCNC: 16 MG/DL
C TRACH RRNA SPEC QL NAA+PROBE: NOT DETECTED
CALCIUM SERPL-MCNC: 10.4 MG/DL
CHLORIDE SERPL-SCNC: 101 MMOL/L
CO2 SERPL-SCNC: 27 MMOL/L
CREAT SERPL-MCNC: 0.83 MG/DL
EGFR: 99 ML/MIN/1.73M2
EOSINOPHIL # BLD AUTO: 0.17 K/UL
EOSINOPHIL NFR BLD AUTO: 2.7 %
GLUCOSE SERPL-MCNC: 100 MG/DL
HCT VFR BLD CALC: 46.7 %
HGB BLD-MCNC: 15.2 G/DL
HIV1+2 AB SPEC QL IA.RAPID: NONREACTIVE
HSV 1+2 IGG SER IA-IMP: NEGATIVE
HSV 1+2 IGG SER IA-IMP: NEGATIVE
HSV1 IGG SER QL: 0.16 INDEX
HSV2 IGG SER QL: 0.08 INDEX
IMM GRANULOCYTES NFR BLD AUTO: 0.2 %
LYMPHOCYTES # BLD AUTO: 1.59 K/UL
LYMPHOCYTES NFR BLD AUTO: 25.2 %
MAN DIFF?: NORMAL
MCHC RBC-ENTMCNC: 32.5 GM/DL
MCHC RBC-ENTMCNC: 33.7 PG
MCV RBC AUTO: 103.5 FL
MONOCYTES # BLD AUTO: 0.36 K/UL
MONOCYTES NFR BLD AUTO: 5.7 %
N GONORRHOEA RRNA SPEC QL NAA+PROBE: NOT DETECTED
N GONORRHOEA RRNA SPEC QL NAA+PROBE: NOT DETECTED
NEUTROPHILS # BLD AUTO: 4.14 K/UL
NEUTROPHILS NFR BLD AUTO: 65.6 %
PLATELET # BLD AUTO: 357 K/UL
POTASSIUM SERPL-SCNC: 4.6 MMOL/L
PROT SERPL-MCNC: 8.7 G/DL
RBC # BLD: 4.51 M/UL
RBC # FLD: 12.1 %
SODIUM SERPL-SCNC: 138 MMOL/L
SOURCE AMPLIFICATION: NORMAL
SOURCE AMPLIFICATION: NORMAL
T PALLIDUM AB SER QL IA: NEGATIVE
WBC # FLD AUTO: 6.31 K/UL

## 2024-02-05 LAB
A PHAGOCYTOPH IGG TITR SER IF: NORMAL
ALBUMIN MFR SERPL ELPH: 52.7 %
ALBUMIN SERPL-MCNC: 4.6 G/DL
ALBUMIN/GLOB SERPL: 1.1 RATIO
ALPHA1 GLOB MFR SERPL ELPH: 2.9 %
ALPHA1 GLOB SERPL ELPH-MCNC: 0.3 G/DL
ALPHA2 GLOB MFR SERPL ELPH: 7.8 %
ALPHA2 GLOB SERPL ELPH-MCNC: 0.7 G/DL
B BURGDOR AB SER QL IA: 0.22 IV
B MICROTI IGG TITR SER: NORMAL
B-GLOBULIN MFR SERPL ELPH: 12.4 %
B-GLOBULIN SERPL ELPH-MCNC: 1.1 G/DL
E CHAFFEENSIS IGG TITR SER IF: NORMAL
GAMMA GLOB FLD ELPH-MCNC: 2.1 G/DL
GAMMA GLOB MFR SERPL ELPH: 24.2 %
INTERPRETATION SERPL IEP-IMP: NORMAL
PROT SERPL-MCNC: 8.7 G/DL
PROT SERPL-MCNC: 8.7 G/DL

## 2024-02-09 NOTE — PLAN
[FreeTextEntry1] : Visual disturbances - has been to ophtalmologist, optometrist, neuroopthalmologist but was negative, will monitor for now Tobacco use disorder - strongly advised to obtain CT chest, pt counseled and will quit on his own. Elevated total protein - will repeat labs, check SPEP/UPEP, refer to hematology

## 2024-02-09 NOTE — REVIEW OF SYSTEMS
[Vision Problems] : vision problems [Joint Pain] : joint pain [Back Pain] : back pain [Fever] : no fever [Night Sweats] : no night sweats [Discharge] : no discharge [Earache] : no earache [Nasal Discharge] : no nasal discharge [Chest Pain] : no chest pain [Orthopena] : no orthopnea [Shortness Of Breath] : no shortness of breath [Cough] : no cough [Abdominal Pain] : no abdominal pain [Vomiting] : no vomiting [Dysuria] : no dysuria [Hematuria] : no hematuria [Itching] : no itching [Skin Rash] : no skin rash

## 2024-02-09 NOTE — HISTORY OF PRESENT ILLNESS
[FreeTextEntry1] : follow-up [de-identified] : 63 year old male here with complaints of visual disturbances which have slightly improved since last visit.  He reports it has not worsened yet in the last month.  Currently he denies any chest pain, cough or shortness of breath.

## 2024-02-18 ENCOUNTER — TRANSCRIPTION ENCOUNTER (OUTPATIENT)
Age: 64
End: 2024-02-18

## 2024-03-28 ENCOUNTER — RX ONLY (RX ONLY)
Age: 64
End: 2024-03-28

## 2024-03-28 ENCOUNTER — OFFICE (OUTPATIENT)
Dept: URBAN - METROPOLITAN AREA CLINIC 27 | Facility: CLINIC | Age: 64
Setting detail: OPHTHALMOLOGY
End: 2024-03-28
Payer: COMMERCIAL

## 2024-03-28 DIAGNOSIS — H11.153: ICD-10-CM

## 2024-03-28 DIAGNOSIS — H53.10: ICD-10-CM

## 2024-03-28 DIAGNOSIS — H16.221: ICD-10-CM

## 2024-03-28 DIAGNOSIS — H02.531: ICD-10-CM

## 2024-03-28 PROBLEM — H02.89 MEIBOMIAN GLAND DYSFUNCTION ; BOTH EYES: Status: ACTIVE | Noted: 2024-03-28

## 2024-03-28 PROCEDURE — 92004 COMPRE OPH EXAM NEW PT 1/>: CPT | Performed by: OPHTHALMOLOGY

## 2024-03-28 ASSESSMENT — REFRACTION_CURRENTRX
OS_SPHERE: -2.25
OD_VPRISM_DIRECTION: SV
OD_OVR_VA: 20/
OS_OVR_VA: 20/
OD_SPHERE: -2.00
OD_AXIS: 071
OD_CYLINDER: +0.25
OS_VPRISM_DIRECTION: SV
OS_AXIS: 106
OS_CYLINDER: +0.25

## 2024-03-28 ASSESSMENT — LID EXAM ASSESSMENTS
OS_MEIBOMITIS: 1+ 2+
OD_MEIBOMITIS: 1+ 2+

## 2024-07-08 ENCOUNTER — LABORATORY RESULT (OUTPATIENT)
Age: 64
End: 2024-07-08

## 2024-07-08 ENCOUNTER — APPOINTMENT (OUTPATIENT)
Dept: INTERNAL MEDICINE | Facility: CLINIC | Age: 64
End: 2024-07-08
Payer: COMMERCIAL

## 2024-07-08 ENCOUNTER — NON-APPOINTMENT (OUTPATIENT)
Age: 64
End: 2024-07-08

## 2024-07-08 VITALS
HEIGHT: 71 IN | HEART RATE: 84 BPM | SYSTOLIC BLOOD PRESSURE: 118 MMHG | TEMPERATURE: 98.4 F | OXYGEN SATURATION: 96 % | BODY MASS INDEX: 23.13 KG/M2 | WEIGHT: 165.19 LBS | DIASTOLIC BLOOD PRESSURE: 74 MMHG

## 2024-07-08 DIAGNOSIS — Z13.228 ENCOUNTER FOR SCREENING FOR OTHER METABOLIC DISORDERS: ICD-10-CM

## 2024-07-08 DIAGNOSIS — F17.200 NICOTINE DEPENDENCE, UNSPECIFIED, UNCOMPLICATED: ICD-10-CM

## 2024-07-08 PROCEDURE — 93000 ELECTROCARDIOGRAM COMPLETE: CPT

## 2024-07-08 PROCEDURE — G2211 COMPLEX E/M VISIT ADD ON: CPT

## 2024-07-08 PROCEDURE — 99214 OFFICE O/P EST MOD 30 MIN: CPT

## 2024-07-09 LAB
25(OH)D3 SERPL-MCNC: 48 NG/ML
ALBUMIN SERPL ELPH-MCNC: 4.5 G/DL
ALP BLD-CCNC: 74 U/L
ALT SERPL-CCNC: 11 U/L
ANION GAP SERPL CALC-SCNC: 12 MMOL/L
APPEARANCE: CLEAR
AST SERPL-CCNC: 20 U/L
BACTERIA: NEGATIVE /HPF
BILIRUB SERPL-MCNC: 1 MG/DL
BILIRUBIN URINE: ABNORMAL
BLOOD URINE: NEGATIVE
BUN SERPL-MCNC: 18 MG/DL
CALCIUM OXALATE CRYSTALS: PRESENT
CALCIUM SERPL-MCNC: 10.5 MG/DL
CAST: 1 /LPF
CHLORIDE SERPL-SCNC: 99 MMOL/L
CHOLEST SERPL-MCNC: 183 MG/DL
COLOR: NORMAL
CREAT SERPL-MCNC: 1 MG/DL
CREAT SPEC-SCNC: 292 MG/DL
CREAT/PROT UR: 0.1 RATIO
EPITHELIAL CELLS: 1 /HPF
ESTIMATED AVERAGE GLUCOSE: 80 MG/DL
GLUCOSE QUALITATIVE U: NEGATIVE MG/DL
GLUCOSE SERPL-MCNC: 73 MG/DL
HBA1C MFR BLD HPLC: 4.4 %
HCT VFR BLD CALC: 47.4 %
HDLC SERPL-MCNC: 47 MG/DL
HGB BLD-MCNC: 15 G/DL
KETONES URINE: ABNORMAL MG/DL
LDLC SERPL CALC-MCNC: 118 MG/DL
LEUKOCYTE ESTERASE URINE: NEGATIVE
MCHC RBC-ENTMCNC: 31.6 GM/DL
MCHC RBC-ENTMCNC: 33.7 PG
MICROSCOPIC-UA: NORMAL
NITRITE URINE: NEGATIVE
NONHDLC SERPL-MCNC: 136 MG/DL
PH URINE: 5.5
PLATELET # BLD AUTO: 378 K/UL
POTASSIUM SERPL-SCNC: 4.9 MMOL/L
PROT SERPL-MCNC: 9 G/DL
PROT UR-MCNC: 20 MG/DL
RBC # BLD: 4.45 M/UL
RBC # FLD: 12.2 %
RED BLOOD CELLS URINE: 6 /HPF
REVIEW: NORMAL
SODIUM SERPL-SCNC: 138 MMOL/L
SPECIFIC GRAVITY URINE: 1.03
TRIGL SERPL-MCNC: 98 MG/DL
TSH SERPL-ACNC: 1.06 UIU/ML
UROBILINOGEN URINE: 1 MG/DL
VIT B12 SERPL-MCNC: 804 PG/ML

## 2024-07-10 ENCOUNTER — APPOINTMENT (OUTPATIENT)
Dept: INTERNAL MEDICINE | Facility: CLINIC | Age: 64
End: 2024-07-10

## 2024-07-10 LAB
ALPHA1UPE: 33.7 %
ALPHA2UPE: 21 %
BETAUPE: 16.5 %
GAMMAUPE: 11.1 %
IGA 24H UR QL IFE: NORMAL
KAPPA LC 24H UR QL: NORMAL
PROT PATTERN 24H UR ELPH-IMP: NORMAL
PROT UR-MCNC: 21 MG/DL
PROT UR-MCNC: 21 MG/DL

## 2024-07-11 ENCOUNTER — APPOINTMENT (OUTPATIENT)
Dept: INTERNAL MEDICINE | Facility: CLINIC | Age: 64
End: 2024-07-11
Payer: COMMERCIAL

## 2024-07-11 VITALS
SYSTOLIC BLOOD PRESSURE: 122 MMHG | OXYGEN SATURATION: 95 % | DIASTOLIC BLOOD PRESSURE: 76 MMHG | HEART RATE: 75 BPM | BODY MASS INDEX: 23.1 KG/M2 | TEMPERATURE: 98.9 F | WEIGHT: 165 LBS | HEIGHT: 71 IN

## 2024-07-11 DIAGNOSIS — R13.10 DYSPHAGIA, UNSPECIFIED: ICD-10-CM

## 2024-07-11 DIAGNOSIS — R77.8 OTHER SPECIFIED ABNORMALITIES OF PLASMA PROTEINS: ICD-10-CM

## 2024-07-11 DIAGNOSIS — R53.83 OTHER FATIGUE: ICD-10-CM

## 2024-07-11 DIAGNOSIS — R51.9 HEADACHE, UNSPECIFIED: ICD-10-CM
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
## 2024-07-11 DIAGNOSIS — R31.29 OTHER MICROSCOPIC HEMATURIA: ICD-10-CM

## 2024-07-11 DIAGNOSIS — M25.50 PAIN IN UNSPECIFIED JOINT: ICD-10-CM

## 2024-07-11 DIAGNOSIS — H53.9 UNSPECIFIED VISUAL DISTURBANCE: ICD-10-CM

## 2024-07-11 PROCEDURE — 99214 OFFICE O/P EST MOD 30 MIN: CPT

## 2024-07-11 RX ORDER — DOXYCYCLINE 100 MG/1
100 TABLET, FILM COATED ORAL
Qty: 14 | Refills: 0 | Status: ACTIVE | COMMUNITY
Start: 2024-07-11

## 2024-07-12 LAB
ALBUMIN MFR SERPL ELPH: 50.8 %
ALBUMIN SERPL ELPH-MCNC: 4.5 G/DL
ALBUMIN SERPL-MCNC: 4.4 G/DL
ALBUMIN/GLOB SERPL: 1 RATIO
ALP BLD-CCNC: 73 U/L
ALPHA1 GLOB SERPL ELPH-MCNC: 0.3 G/DL
ALPHA2 GLOB MFR SERPL ELPH: 8.5 %
ALPHA2 GLOB SERPL ELPH-MCNC: 0.7 G/DL
ALT SERPL-CCNC: 15 U/L
ANION GAP SERPL CALC-SCNC: 16 MMOL/L
AST SERPL-CCNC: 18 U/L
B BURGDOR AB SER-IMP: NEGATIVE
B BURGDOR DNA SPEC QL NAA+PROBE: NEGATIVE
B BURGDOR IGG+IGM SER QL: 0.1 INDEX
BILIRUB SERPL-MCNC: 0.9 MG/DL
BUN SERPL-MCNC: 15 MG/DL
CALCIUM SERPL-MCNC: 9.6 MG/DL
CHLORIDE SERPL-SCNC: 100 MMOL/L
CK SERPL-CCNC: 56 U/L
CO2 SERPL-SCNC: 24 MMOL/L
CREAT SERPL-MCNC: 0.95 MG/DL
DEPRECATED KAPPA LC FREE/LAMBDA SER: 2.43 RATIO
EGFR: 90 ML/MIN/1.73M2
ERYTHROCYTE [SEDIMENTATION RATE] IN BLOOD BY WESTERGREN METHOD: 20 MM/HR
GAMMA GLOB FLD ELPH-MCNC: 2.3 G/DL
GAMMA GLOB MFR SERPL ELPH: 26.2 %
GLUCOSE SERPL-MCNC: 73 MG/DL
INTERPRETATION SERPL IEP-IMP: NORMAL
KAPPA LC CSF-MCNC: 2.79 MG/DL
KAPPA LC SERPL-MCNC: 6.79 MG/DL
M PROTEIN MFR SERPL ELPH: NORMAL
POTASSIUM SERPL-SCNC: 4.5 MMOL/L
PROT SERPL-MCNC: 8.2 G/DL
PROT SERPL-MCNC: 8.6 G/DL
PROT SERPL-MCNC: 8.6 G/DL
RHEUMATOID FACT SER QL: 48 IU/ML

## 2024-07-12 RX ORDER — DOXYCYCLINE 100 MG/1
100 TABLET, FILM COATED ORAL DAILY
Qty: 2 | Refills: 0 | Status: ACTIVE | COMMUNITY
Start: 2024-07-12 | End: 1900-01-01

## 2024-07-13 LAB
B BURGDOR AB SER QL IA: 0.19 IV
B MICROTI IGG TITR SER: NORMAL
E CHAFFEENSIS IGG TITR SER IF: NORMAL

## 2024-07-14 LAB
A PHAGOCYTOPH IGG TITR SER IF: NORMAL
B BURGDOR AB SER QL IA: 0.22 IV
B MICROTI IGG TITR SER: NORMAL
E CHAFFEENSIS IGG TITR SER IF: NORMAL

## 2024-07-15 LAB
ALBUMIN MFR SERPL ELPH: 51.8 %
ALBUMIN SERPL-MCNC: 4.2 G/DL
ALBUMIN/GLOB SERPL: 1 RATIO
ALPHA1 GLOB MFR SERPL ELPH: 3.2 %
ALPHA1 GLOB SERPL ELPH-MCNC: 0.3 G/DL
ALPHA2 GLOB MFR SERPL ELPH: 8.2 %
ALPHA2 GLOB SERPL ELPH-MCNC: 0.7 G/DL
B-GLOBULIN MFR SERPL ELPH: 11.2 %
B-GLOBULIN SERPL ELPH-MCNC: 0.9 G/DL
GAMMA GLOB FLD ELPH-MCNC: 2.1 G/DL
GAMMA GLOB MFR SERPL ELPH: 25.6 %
INTERPRETATION SERPL IEP-IMP: NORMAL
M PROTEIN MFR SERPL ELPH: NORMAL
M PROTEIN SPEC IFE-MCNC: NORMAL
MONOCLON BAND OBS SERPL: NORMAL
PROT SERPL-MCNC: 8.2 G/DL
PROT SERPL-MCNC: 8.2 G/DL

## 2024-07-16 DIAGNOSIS — R77.8 OTHER SPECIFIED ABNORMALITIES OF PLASMA PROTEINS: ICD-10-CM

## 2024-07-19 LAB
ANA SER IF-ACNC: NEGATIVE
CCP AB SER IA-ACNC: 13 UNITS

## 2024-09-02 ENCOUNTER — NON-APPOINTMENT (OUTPATIENT)
Age: 64
End: 2024-09-02

## 2024-09-11 ENCOUNTER — OUTPATIENT (OUTPATIENT)
Dept: OUTPATIENT SERVICES | Facility: HOSPITAL | Age: 64
LOS: 1 days | Discharge: HOME | End: 2024-09-11

## 2024-09-11 DIAGNOSIS — R77.9 ABNORMALITY OF PLASMA PROTEIN, UNSPECIFIED: ICD-10-CM

## 2024-09-18 ENCOUNTER — RESULT REVIEW (OUTPATIENT)
Age: 64
End: 2024-09-18

## 2024-09-18 ENCOUNTER — APPOINTMENT (OUTPATIENT)
Dept: HEMATOLOGY ONCOLOGY | Facility: CLINIC | Age: 64
End: 2024-09-18

## 2024-09-18 VITALS
HEART RATE: 72 BPM | SYSTOLIC BLOOD PRESSURE: 135 MMHG | DIASTOLIC BLOOD PRESSURE: 75 MMHG | TEMPERATURE: 97.3 F | BODY MASS INDEX: 23.61 KG/M2 | OXYGEN SATURATION: 97 % | WEIGHT: 168.65 LBS | HEIGHT: 70.98 IN | RESPIRATION RATE: 16 BRPM

## 2024-09-18 DIAGNOSIS — D47.2 MONOCLONAL GAMMOPATHY: ICD-10-CM

## 2024-09-18 DIAGNOSIS — Z81.8 FAMILY HISTORY OF OTHER MENTAL AND BEHAVIORAL DISORDERS: ICD-10-CM

## 2024-09-18 DIAGNOSIS — Z78.9 OTHER SPECIFIED HEALTH STATUS: ICD-10-CM

## 2024-09-18 DIAGNOSIS — D75.89 OTHER SPECIFIED DISEASES OF BLOOD AND BLOOD-FORMING ORGANS: ICD-10-CM

## 2024-09-18 DIAGNOSIS — Z80.7 FAMILY HISTORY OF OTHER MALIGNANT NEOPLASMS OF LYMPHOID, HEMATOPOIETIC AND RELATED TISSUES: ICD-10-CM

## 2024-09-18 DIAGNOSIS — Z87.19 PERSONAL HISTORY OF OTHER DISEASES OF THE DIGESTIVE SYSTEM: ICD-10-CM

## 2024-09-18 DIAGNOSIS — F17.200 NICOTINE DEPENDENCE, UNSPECIFIED, UNCOMPLICATED: ICD-10-CM

## 2024-09-18 LAB
BASOPHILS # BLD AUTO: 0.05 K/UL — SIGNIFICANT CHANGE UP (ref 0–0.2)
BASOPHILS NFR BLD AUTO: 0.6 % — SIGNIFICANT CHANGE UP (ref 0–2)
EOSINOPHIL # BLD AUTO: 0.28 K/UL — SIGNIFICANT CHANGE UP (ref 0–0.5)
EOSINOPHIL NFR BLD AUTO: 3.6 % — SIGNIFICANT CHANGE UP (ref 0–6)
ERYTHROCYTE [SEDIMENTATION RATE] IN BLOOD: 17 MM/HR — SIGNIFICANT CHANGE UP (ref 0–20)
HCT VFR BLD CALC: 43.2 % — SIGNIFICANT CHANGE UP (ref 39–50)
HGB BLD-MCNC: 14.5 G/DL — SIGNIFICANT CHANGE UP (ref 13–17)
IMM GRANULOCYTES NFR BLD AUTO: 0.3 % — SIGNIFICANT CHANGE UP (ref 0–0.9)
LYMPHOCYTES # BLD AUTO: 2.08 K/UL — SIGNIFICANT CHANGE UP (ref 1–3.3)
LYMPHOCYTES # BLD AUTO: 27 % — SIGNIFICANT CHANGE UP (ref 13–44)
MCHC RBC-ENTMCNC: 33.6 G/DL — SIGNIFICANT CHANGE UP (ref 32–36)
MCHC RBC-ENTMCNC: 34 PG — SIGNIFICANT CHANGE UP (ref 27–34)
MCV RBC AUTO: 101.2 FL — HIGH (ref 80–100)
MONOCYTES # BLD AUTO: 0.55 K/UL — SIGNIFICANT CHANGE UP (ref 0–0.9)
MONOCYTES NFR BLD AUTO: 7.1 % — SIGNIFICANT CHANGE UP (ref 2–14)
NEUTROPHILS # BLD AUTO: 4.73 K/UL — SIGNIFICANT CHANGE UP (ref 1.8–7.4)
NEUTROPHILS NFR BLD AUTO: 61.4 % — SIGNIFICANT CHANGE UP (ref 43–77)
NRBC # BLD: 0 /100 WBCS — SIGNIFICANT CHANGE UP (ref 0–0)
NRBC BLD-RTO: 0 /100 WBCS — SIGNIFICANT CHANGE UP (ref 0–0)
PLATELET # BLD AUTO: 299 K/UL — SIGNIFICANT CHANGE UP (ref 150–400)
RBC # BLD: 4.27 M/UL — SIGNIFICANT CHANGE UP (ref 4.2–5.8)
RBC # FLD: 12.1 % — SIGNIFICANT CHANGE UP (ref 10.3–14.5)
RETICS #: 79.8 K/UL — SIGNIFICANT CHANGE UP (ref 25–125)
RETICS/RBC NFR: 1.9 % — SIGNIFICANT CHANGE UP (ref 0.5–2.5)
WBC # BLD: 7.71 K/UL — SIGNIFICANT CHANGE UP (ref 3.8–10.5)
WBC # FLD AUTO: 7.71 K/UL — SIGNIFICANT CHANGE UP (ref 3.8–10.5)

## 2024-09-18 PROCEDURE — 99204 OFFICE O/P NEW MOD 45 MIN: CPT

## 2024-09-19 LAB
ALBUMIN SERPL ELPH-MCNC: 4.4 G/DL
ALP BLD-CCNC: 70 U/L
ALT SERPL-CCNC: 12 U/L
ANION GAP SERPL CALC-SCNC: 9 MMOL/L
AST SERPL-CCNC: 16 U/L
BILIRUB SERPL-MCNC: 1 MG/DL
BUN SERPL-MCNC: 18 MG/DL
CALCIUM SERPL-MCNC: 9.7 MG/DL
CHLORIDE SERPL-SCNC: 102 MMOL/L
CO2 SERPL-SCNC: 26 MMOL/L
CREAT SERPL-MCNC: 0.85 MG/DL
CRP SERPL-MCNC: <3 MG/L
EGFR: 98 ML/MIN/1.73M2
FOLATE SERPL-MCNC: 10 NG/ML
GLUCOSE SERPL-MCNC: 80 MG/DL
HAPTOGLOB SERPL-MCNC: 123 MG/DL
LDH SERPL-CCNC: 134 U/L
POTASSIUM SERPL-SCNC: 4.3 MMOL/L
PROT SERPL-MCNC: 8.2 G/DL
SODIUM SERPL-SCNC: 137 MMOL/L
TSH SERPL-ACNC: 0.93 UIU/ML
VIT B12 SERPL-MCNC: 631 PG/ML

## 2024-09-24 LAB
ALBUMIN MFR SERPL ELPH: 52.7 %
ALBUMIN SERPL-MCNC: 4.2 G/DL
ALBUMIN/GLOB SERPL: 1.1 RATIO
ALPHA1 GLOB MFR SERPL ELPH: 3.1 %
ALPHA1 GLOB SERPL ELPH-MCNC: 0.2 G/DL
ALPHA2 GLOB MFR SERPL ELPH: 7.9 %
ALPHA2 GLOB SERPL ELPH-MCNC: 0.6 G/DL
B-GLOBULIN MFR SERPL ELPH: 11.1 %
B-GLOBULIN SERPL ELPH-MCNC: 0.9 G/DL
DEPRECATED KAPPA LC FREE/LAMBDA SER: 3.04 RATIO
GAMMA GLOB FLD ELPH-MCNC: 2 G/DL
GAMMA GLOB MFR SERPL ELPH: 25.2 %
IGA SER QL IEP: 361 MG/DL
IGG SER QL IEP: 1915 MG/DL
IGM SER QL IEP: 258 MG/DL
INTERPRETATION SERPL IEP-IMP: NORMAL
KAPPA LC CSF-MCNC: 2.27 MG/DL
KAPPA LC SERPL-MCNC: 6.89 MG/DL
M PROTEIN MFR SERPL ELPH: NORMAL
M PROTEIN SPEC IFE-MCNC: NORMAL
MONOCLON BAND OBS SERPL: NORMAL
PROT SERPL-MCNC: 8 G/DL
PROT SERPL-MCNC: 8 G/DL

## 2024-09-27 PROBLEM — Z81.8 FAMILY HISTORY OF SCHIZOPHRENIA: Status: ACTIVE | Noted: 2024-09-27

## 2024-09-27 PROBLEM — D75.89 MACROCYTOSIS: Status: ACTIVE | Noted: 2024-09-27

## 2024-09-27 PROBLEM — Z80.7 FAMILY HISTORY OF NON-HODGKIN'S LYMPHOMA: Status: ACTIVE | Noted: 2024-09-27

## 2024-09-27 PROBLEM — Z87.19 HISTORY OF GASTROESOPHAGEAL REFLUX (GERD): Status: RESOLVED | Noted: 2024-09-27 | Resolved: 2024-09-27

## 2024-09-27 PROBLEM — Z87.19 HISTORY OF ESOPHAGEAL STRICTURE: Status: RESOLVED | Noted: 2024-09-27 | Resolved: 2024-09-27

## 2024-09-27 PROBLEM — D47.2 MONOCLONAL GAMMOPATHY OF UNDETERMINED SIGNIFICANCE: Status: ACTIVE | Noted: 2024-09-27

## 2024-09-27 PROBLEM — F17.200 CURRENT EVERY DAY SMOKER: Status: ACTIVE | Noted: 2024-09-27

## 2024-09-27 PROBLEM — Z78.9 RARELY CONSUMES ALCOHOL: Status: ACTIVE | Noted: 2024-09-27

## 2024-09-27 RX ORDER — MULTIVITAMIN
TABLET ORAL
Refills: 0 | Status: ACTIVE | COMMUNITY

## 2024-09-27 RX ORDER — MULTIVIT-MIN/IRON/FOLIC ACID/K 18-600-40
CAPSULE ORAL
Refills: 0 | Status: ACTIVE | COMMUNITY

## 2024-09-27 NOTE — ASSESSMENT
[FreeTextEntry1] : 64-year-old man with h/o GERD, esophageal stricture and possible recent nephrolithiasis here for evaluation of monoclonal gammopathy and macrocytosis.    Serum protein electrophoresis on 7/11/2024 showed weak gamma migrating paraprotein with M spike unable to quantitate.  Serum immunofixation on 7/11/2024 showed weak IgG Kappa Band.  Kappa/Lambda FLC ratio was 2.43.  Recent creatinine was 0.95, calcium 9.6 and total protein 8.2.  Recent CBC showed WBC 8.72, HGB 15.0, HCT 47.4, .5 and ,000.  Rheumatoid Factor 48.  He was hospitalized in early January 2024 for c/o visual loss.  He was evaluated by ophthalmology and neuro-ophthalmology with reportedly negative work-up.  He states that his vision has improved since 1/2024. He still has some floaters in right eye from partial retinal tear.   Plan: Will check CBC, CMP, SPEP, serum immunofixation and quantitative serum free light chains.  For work-up of macrocytosis, will check B12, Folate, TSH, LDH, haptoglobin and reticulocyte count.  Recommend follow-up with rheumatology as scheduled.  He has multiple arthralgias and RF of 48.  Recommend follow-up with GI for work-up of dysphagia.  He should also have screening colonoscopy.  Patient is reluctant to be evaluated by GI because of concern of complications from endoscopic procedures.  Recommend smoking cessation.  Patient is not interested at this time.  Recommend chest-xray or low dose CT chest given smoking history over 50 years.  He is concerned about the radiation associated with imaging.  Patient advised to call me in 10 days to discuss all laboratory results.

## 2024-09-27 NOTE — PHYSICAL EXAM
[Normal] : normal appearance, no rash, nodules, vesicles, ulcers, erythema [de-identified] : No cervical, axillary or inguinal LAD noted

## 2024-09-27 NOTE — PHYSICAL EXAM
[Normal] : normal appearance, no rash, nodules, vesicles, ulcers, erythema [de-identified] : No cervical, axillary or inguinal LAD noted

## 2024-09-27 NOTE — HISTORY OF PRESENT ILLNESS
[de-identified] : Initial Consultation on 2024 Referred by: Dr. Castro CC: MGUS, macrocytosis   HPI: 64-year-old man with h/o GERD, esophageal stricture and possible recent nephrolithiasis here for evaluation of monoclonal gammopathy and macrocytosis.    Serum protein electrophoresis on 2024 showed weak gamma migrating paraprotein with M spike unable to quantitate.  Serum immunofixation on 2024 showed weak IgG Kappa Band.  Kappa/Lambda FLC ratio was 2.43.  Recent creatinine was 0.95, calcium 9.6 and total protein 8.2.  Recent CBC showed WBC 8.72, HGB 15.0, HCT 47.4, .5 and ,000.  Rheumatoid Factor 48.  He was hospitalized in early 2024 for c/o visual loss.  He was evaluated by ophthalmology and neuro-ophthalmology with reportedly negative work-up.  He states that his vision has improved since 2024. He still has some floaters in right eye from partial retinal tear.   Patient c/o stable fatigue.  He reports dysphagia to solids for over 10 years. He only eats soft foods. He last saw GI and underwent an endoscopy over 10 years.  He is concerned about the risk of esophageal tear/rupture and does not want to undergo any invasive procedures. He reports that he had abdominal pain this week and thought he might have passed a kidney stone.  He c/o occasional palpitations. He c/o chronic intermittent back pain.  He c/o arthritis in hips/shoulders/fingers/wrists; he has an appointment with rheumatology on 10/1/2024.  He c/o abnormal crease/bump on 4th digit left hand for the past 4 years. Patient denies any fever/chills, recent infections, CP, palpitations, SOB, n/v/d, bloody/black stools, frequent headaches, dizziness, night sweats or swollen glands.   Hospitalizations: Hospitalized from -2024 at [facility] for vision loss  Medications: See List.  Medications reconciled Also takes Turmeric, Vitamin C, and multivitamin.   Allergies: Unknown cough syrup caused hives.    Social History: . Has two children.  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  Smokes<1ppd for over 50 years. Rare alcohol use Born in [place] Mom is from [facility].  Father [place]  Eats a soft diet.    Family History: Father , NHL, schizophrenia   Healthcare Maintenance: Primary care doctor- Dr. Loya/ Dr. Castro- last seen 2024 Denies colonoscopy- Last endoscopy>10 years ago Last seen GI> 10 years ago.  Dr. Pretty- ophthalmology   [de-identified] : Initial Visit

## 2024-09-27 NOTE — HISTORY OF PRESENT ILLNESS
[de-identified] : Initial Consultation on 2024 Referred by: Dr. Castro CC: MGUS, macrocytosis   HPI: 64-year-old man with h/o GERD, esophageal stricture and possible recent nephrolithiasis here for evaluation of monoclonal gammopathy and macrocytosis.    Serum protein electrophoresis on 2024 showed weak gamma migrating paraprotein with M spike unable to quantitate.  Serum immunofixation on 2024 showed weak IgG Kappa Band.  Kappa/Lambda FLC ratio was 2.43.  Recent creatinine was 0.95, calcium 9.6 and total protein 8.2.  Recent CBC showed WBC 8.72, HGB 15.0, HCT 47.4, .5 and ,000.  Rheumatoid Factor 48.  He was hospitalized in early 2024 for c/o visual loss.  He was evaluated by ophthalmology and neuro-ophthalmology with reportedly negative work-up.  He states that his vision has improved since 2024. He still has some floaters in right eye from partial retinal tear.   Patient c/o stable fatigue.  He reports dysphagia to solids for over 10 years. He only eats soft foods. He last saw GI and underwent an endoscopy over 10 years.  He is concerned about the risk of esophageal tear/rupture and does not want to undergo any invasive procedures. He reports that he had abdominal pain this week and thought he might have passed a kidney stone.  He c/o occasional palpitations. He c/o chronic intermittent back pain.  He c/o arthritis in hips/shoulders/fingers/wrists; he has an appointment with rheumatology on 10/1/2024.  He c/o abnormal crease/bump on 4th digit left hand for the past 4 years. Patient denies any fever/chills, recent infections, CP, palpitations, SOB, n/v/d, bloody/black stools, frequent headaches, dizziness, night sweats or swollen glands.   Hospitalizations: Hospitalized from -2024 at [facility] for vision loss  Medications: See List.  Medications reconciled Also takes Turmeric, Vitamin C, and multivitamin.   Allergies: Unknown cough syrup caused hives.    Social History: . Has two children.  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  Smokes<1ppd for over 50 years. Rare alcohol use Born in [place] Mom is from [facility].  Father [place]  Eats a soft diet.    Family History: Father , NHL, schizophrenia   Healthcare Maintenance: Primary care doctor- Dr. Loya/ Dr. Castro- last seen 2024 Denies colonoscopy- Last endoscopy>10 years ago Last seen GI> 10 years ago.  Dr. Pretty- ophthalmology   [de-identified] : Initial Visit

## 2024-10-01 ENCOUNTER — APPOINTMENT (OUTPATIENT)
Dept: RHEUMATOLOGY | Facility: CLINIC | Age: 64
End: 2024-10-01
Payer: COMMERCIAL

## 2024-10-01 VITALS
HEIGHT: 70 IN | BODY MASS INDEX: 23.91 KG/M2 | DIASTOLIC BLOOD PRESSURE: 76 MMHG | OXYGEN SATURATION: 96 % | SYSTOLIC BLOOD PRESSURE: 126 MMHG | WEIGHT: 167 LBS | HEART RATE: 80 BPM

## 2024-10-01 DIAGNOSIS — M25.511 PAIN IN RIGHT SHOULDER: ICD-10-CM

## 2024-10-01 DIAGNOSIS — R76.8 OTHER SPECIFIED ABNORMAL IMMUNOLOGICAL FINDINGS IN SERUM: ICD-10-CM

## 2024-10-01 DIAGNOSIS — M25.512 PAIN IN RIGHT SHOULDER: ICD-10-CM

## 2024-10-01 DIAGNOSIS — M25.50 PAIN IN UNSPECIFIED JOINT: ICD-10-CM

## 2024-10-01 PROCEDURE — 99204 OFFICE O/P NEW MOD 45 MIN: CPT | Mod: GC

## 2024-10-01 NOTE — CONSULT LETTER
[Dear  ___] : Dear  [unfilled], [Please see my note below.] : Please see my note below. [Sincerely,] : Sincerely, [FreeTextEntry3] : Deepa Skaggs MD , [facility] at [facility] Division of Rheumatology

## 2024-10-01 NOTE — DATA REVIEWED
[FreeTextEntry1] : Labs and chart notes reviewed today with patient RF mildly elevated negative inflammatory markers

## 2024-10-01 NOTE — CONSULT LETTER
[Dear  ___] : Dear  [unfilled], [Please see my note below.] : Please see my note below. [Sincerely,] : Sincerely, [FreeTextEntry3] : Deepa Skaggs MD , [facility] at [facility] Division of Rheumatology

## 2024-10-01 NOTE — PHYSICAL EXAM
[General Appearance - Alert] : alert [General Appearance - Well Nourished] : well nourished [] : no respiratory distress [Heart Rate And Rhythm] : heart rate was normal and rhythm regular [Abdomen Soft] : soft [Abnormal Walk] : normal gait [Musculoskeletal - Swelling] : no joint swelling seen [Skin Color & Pigmentation] : normal skin color and pigmentation [No Focal Deficits] : no focal deficits [FreeTextEntry1] : Tenderness in BIlateral CMC, all PIP and DIPs. No synovitis, Full ROM in all joints. Bilateral shoulder empty can test +, Bilateral knee crepitus with full ROM, no swelling, tenderness or effusion [Impaired Insight] : insight and judgment were intact

## 2024-10-01 NOTE — HISTORY OF PRESENT ILLNESS
[Arthralgias] : arthralgias [Morning Stiffness] : morning stiffness [None] : The patient is currently asymptomatic [Visual Changes] : visual changes [Eye Redness] : eye redness [FreeTextEntry1] : A 64 year old man with MGUS, GERD, esophageal stricture, referred for elevated RF and polyarthralgia  Polyarthralgia: since several years, age of 12, says has bilateral shoulders, elbows, wrists , hands , knees and hip pain. AM stiffness in UE joint lasting for about 30 minutes. No associated swelling, redness of any joint. He feels his fingers lock up occasionally.   Denies photosensitivity, malar rash, mouth ulcers, alopecia, dry eyes, dry mouth, Raynaud's phenomenon, puffy fingers, pleuritic chest pain, SOB , previous blood transfusion, limb weakness, weight loss, anorexia, fever, chills, other skin rash  He also has chronic blurry vision since january 2024, with normal eye exam, follws with  Retinal specialist, per patient he didnt have abnormality and couldnt say why he has cloiudy vision. Patient however remains functional, independent of ADL. He has chronic conjunctival injection , without burning or pain, but sensitive to light  PMH: nephrolithiasis Family h/o : Negative for AID   [Anorexia] : no anorexia [Joint Swelling] : no joint swelling [Joint Warmth] : no joint warmth [Joint Deformity] : no joint deformity [Myalgias] : no myalgias [Muscle Weakness] : no muscle weakness [Muscle Spasms] : no muscle spasms [Eye Pain] : no eye pain [Dry Eyes] : no dry eyes

## 2024-10-01 NOTE — ASSESSMENT
[FreeTextEntry1] : A 64 year old man with MGUS , Chronic blurry vision referred for polyarthralgia and RF positivity  #polyarthralgia -Likely 2/2 OA, RA not likely given non inflammatory nature of pain, no synovitis on exam -Predominantly hands and shoulders bilaterally  -AM stiffness <30 minutes, No swelling, No synovitis, PIP , DIP, CMC of hands. More s/o OA than RA -RF mild positive, normal inflammatory markers, negative CCP  #Shoulder pain -Likely 2/2 rotator cuff tendinopathy -Empty can positive bilaterally -Recommended PT, patient refused  Plan: -Will get xrays of hands and shoulders -Recommended PT, patient refused -follow up with ophthalmology regarding blurry vision  will get records   D/w Dr. Michael Mayer PGY4-Rheumatology Fellow

## 2024-10-07 ENCOUNTER — NON-APPOINTMENT (OUTPATIENT)
Age: 64
End: 2024-10-07